# Patient Record
Sex: MALE | Race: WHITE | NOT HISPANIC OR LATINO
[De-identification: names, ages, dates, MRNs, and addresses within clinical notes are randomized per-mention and may not be internally consistent; named-entity substitution may affect disease eponyms.]

---

## 2017-05-18 ENCOUNTER — NON-APPOINTMENT (OUTPATIENT)
Age: 52
End: 2017-05-18

## 2017-05-18 ENCOUNTER — APPOINTMENT (OUTPATIENT)
Dept: CARDIOLOGY | Facility: CLINIC | Age: 52
End: 2017-05-18

## 2017-05-18 VITALS
HEART RATE: 62 BPM | SYSTOLIC BLOOD PRESSURE: 128 MMHG | WEIGHT: 224 LBS | HEIGHT: 74 IN | DIASTOLIC BLOOD PRESSURE: 76 MMHG | OXYGEN SATURATION: 97 % | BODY MASS INDEX: 28.75 KG/M2

## 2017-05-18 DIAGNOSIS — E78.00 PURE HYPERCHOLESTEROLEMIA, UNSPECIFIED: ICD-10-CM

## 2017-07-07 ENCOUNTER — APPOINTMENT (OUTPATIENT)
Dept: CARDIOLOGY | Facility: CLINIC | Age: 52
End: 2017-07-07

## 2017-07-31 ENCOUNTER — APPOINTMENT (OUTPATIENT)
Dept: CARDIOLOGY | Facility: CLINIC | Age: 52
End: 2017-07-31
Payer: COMMERCIAL

## 2017-07-31 PROCEDURE — 93015 CV STRESS TEST SUPVJ I&R: CPT

## 2017-08-01 ENCOUNTER — RESULT REVIEW (OUTPATIENT)
Age: 52
End: 2017-08-01

## 2017-08-01 DIAGNOSIS — R94.39 ABNORMAL RESULT OF OTHER CARDIOVASCULAR FUNCTION STUDY: ICD-10-CM

## 2017-08-09 ENCOUNTER — RESULT REVIEW (OUTPATIENT)
Age: 52
End: 2017-08-09

## 2017-08-09 LAB
ANION GAP SERPL CALC-SCNC: 14 MMOL/L
BUN SERPL-MCNC: 13 MG/DL
CALCIUM SERPL-MCNC: 9.8 MG/DL
CHLORIDE SERPL-SCNC: 102 MMOL/L
CHOLEST SERPL-MCNC: 156 MG/DL
CHOLEST/HDLC SERPL: 5.4 RATIO
CO2 SERPL-SCNC: 25 MMOL/L
CREAT SERPL-MCNC: 1.3 MG/DL
GLUCOSE SERPL-MCNC: 96 MG/DL
HBA1C MFR BLD HPLC: 5.7 %
HDLC SERPL-MCNC: 29 MG/DL
LDLC SERPL CALC-MCNC: 92 MG/DL
POTASSIUM SERPL-SCNC: 4.5 MMOL/L
SODIUM SERPL-SCNC: 141 MMOL/L
TRIGL SERPL-MCNC: 173 MG/DL

## 2017-08-22 ENCOUNTER — OUTPATIENT (OUTPATIENT)
Dept: OUTPATIENT SERVICES | Facility: HOSPITAL | Age: 52
LOS: 1 days | End: 2017-08-22
Payer: COMMERCIAL

## 2017-08-22 DIAGNOSIS — R94.39 ABNORMAL RESULT OF OTHER CARDIOVASCULAR FUNCTION STUDY: ICD-10-CM

## 2017-08-22 PROCEDURE — 75574 CT ANGIO HRT W/3D IMAGE: CPT | Mod: 26

## 2017-08-22 PROCEDURE — 75574 CT ANGIO HRT W/3D IMAGE: CPT

## 2017-08-23 ENCOUNTER — OTHER (OUTPATIENT)
Age: 52
End: 2017-08-23

## 2017-08-23 RX ORDER — METOPROLOL SUCCINATE 25 MG/1
25 TABLET, EXTENDED RELEASE ORAL
Qty: 90 | Refills: 3 | Status: ACTIVE | COMMUNITY
Start: 2017-08-23 | End: 1900-01-01

## 2017-08-24 ENCOUNTER — OUTPATIENT (OUTPATIENT)
Dept: OUTPATIENT SERVICES | Facility: HOSPITAL | Age: 52
LOS: 1 days | Discharge: HOME | End: 2017-08-24
Payer: COMMERCIAL

## 2017-08-24 ENCOUNTER — TRANSCRIPTION ENCOUNTER (OUTPATIENT)
Age: 52
End: 2017-08-24

## 2017-08-24 VITALS
HEART RATE: 66 BPM | TEMPERATURE: 98 F | DIASTOLIC BLOOD PRESSURE: 90 MMHG | OXYGEN SATURATION: 98 % | RESPIRATION RATE: 18 BRPM | SYSTOLIC BLOOD PRESSURE: 138 MMHG

## 2017-08-24 VITALS
SYSTOLIC BLOOD PRESSURE: 137 MMHG | RESPIRATION RATE: 16 BRPM | HEART RATE: 66 BPM | DIASTOLIC BLOOD PRESSURE: 80 MMHG | OXYGEN SATURATION: 98 %

## 2017-08-24 DIAGNOSIS — Z98.890 OTHER SPECIFIED POSTPROCEDURAL STATES: Chronic | ICD-10-CM

## 2017-08-24 DIAGNOSIS — J33.8 OTHER POLYP OF SINUS: Chronic | ICD-10-CM

## 2017-08-24 DIAGNOSIS — R94.39 ABNORMAL RESULT OF OTHER CARDIOVASCULAR FUNCTION STUDY: ICD-10-CM

## 2017-08-24 LAB
ALBUMIN SERPL ELPH-MCNC: 4.2 G/DL — SIGNIFICANT CHANGE UP (ref 3.3–5.2)
ALP SERPL-CCNC: 36 U/L — LOW (ref 40–120)
ALT FLD-CCNC: 32 U/L — SIGNIFICANT CHANGE UP
ANION GAP SERPL CALC-SCNC: 13 MMOL/L — SIGNIFICANT CHANGE UP (ref 5–17)
AST SERPL-CCNC: 20 U/L — SIGNIFICANT CHANGE UP
BILIRUB DIRECT SERPL-MCNC: 0.1 MG/DL — SIGNIFICANT CHANGE UP (ref 0–0.3)
BILIRUB INDIRECT FLD-MCNC: 0.2 MG/DL — SIGNIFICANT CHANGE UP (ref 0.2–1)
BILIRUB SERPL-MCNC: 0.3 MG/DL — LOW (ref 0.4–2)
BUN SERPL-MCNC: 13 MG/DL — SIGNIFICANT CHANGE UP (ref 8–20)
CALCIUM SERPL-MCNC: 9.2 MG/DL — SIGNIFICANT CHANGE UP (ref 8.6–10.2)
CHLORIDE SERPL-SCNC: 103 MMOL/L — SIGNIFICANT CHANGE UP (ref 98–107)
CHOLEST SERPL-MCNC: 138 MG/DL — SIGNIFICANT CHANGE UP (ref 110–199)
CO2 SERPL-SCNC: 25 MMOL/L — SIGNIFICANT CHANGE UP (ref 22–29)
CREAT SERPL-MCNC: 0.89 MG/DL — SIGNIFICANT CHANGE UP (ref 0.5–1.3)
GLUCOSE SERPL-MCNC: 108 MG/DL — SIGNIFICANT CHANGE UP (ref 70–115)
HCT VFR BLD CALC: 37.2 % — LOW (ref 42–52)
HDLC SERPL-MCNC: 31 MG/DL — LOW
HGB BLD-MCNC: 13 G/DL — LOW (ref 14–18)
INR BLD: 1.02 RATIO — SIGNIFICANT CHANGE UP (ref 0.88–1.16)
LIPID PNL WITH DIRECT LDL SERPL: 73 MG/DL — SIGNIFICANT CHANGE UP
MCHC RBC-ENTMCNC: 31.6 PG — HIGH (ref 27–31)
MCHC RBC-ENTMCNC: 34.9 G/DL — SIGNIFICANT CHANGE UP (ref 32–36)
MCV RBC AUTO: 90.3 FL — SIGNIFICANT CHANGE UP (ref 80–94)
PLATELET # BLD AUTO: 183 K/UL — SIGNIFICANT CHANGE UP (ref 150–400)
POTASSIUM SERPL-MCNC: 4 MMOL/L — SIGNIFICANT CHANGE UP (ref 3.5–5.3)
POTASSIUM SERPL-SCNC: 4 MMOL/L — SIGNIFICANT CHANGE UP (ref 3.5–5.3)
PROT SERPL-MCNC: 6.6 G/DL — SIGNIFICANT CHANGE UP (ref 6.6–8.7)
PROTHROM AB SERPL-ACNC: 11.2 SEC — SIGNIFICANT CHANGE UP (ref 9.8–12.7)
RBC # BLD: 4.12 M/UL — LOW (ref 4.6–6.2)
RBC # FLD: 12.9 % — SIGNIFICANT CHANGE UP (ref 11–15.6)
SODIUM SERPL-SCNC: 141 MMOL/L — SIGNIFICANT CHANGE UP (ref 135–145)
TOTAL CHOLESTEROL/HDL RATIO MEASUREMENT: 4 RATIO — SIGNIFICANT CHANGE UP (ref 3.4–9.6)
TRIGL SERPL-MCNC: 171 MG/DL — SIGNIFICANT CHANGE UP (ref 10–200)
WBC # BLD: 3.9 K/UL — LOW (ref 4.8–10.8)
WBC # FLD AUTO: 3.9 K/UL — LOW (ref 4.8–10.8)

## 2017-08-24 PROCEDURE — 93571 IV DOP VEL&/PRESS C FLO 1ST: CPT | Mod: LD

## 2017-08-24 PROCEDURE — 80048 BASIC METABOLIC PNL TOTAL CA: CPT

## 2017-08-24 PROCEDURE — C1769: CPT

## 2017-08-24 PROCEDURE — 85610 PROTHROMBIN TIME: CPT

## 2017-08-24 PROCEDURE — C1887: CPT

## 2017-08-24 PROCEDURE — 93005 ELECTROCARDIOGRAM TRACING: CPT

## 2017-08-24 PROCEDURE — 85027 COMPLETE CBC AUTOMATED: CPT

## 2017-08-24 PROCEDURE — 80076 HEPATIC FUNCTION PANEL: CPT

## 2017-08-24 PROCEDURE — 93458 L HRT ARTERY/VENTRICLE ANGIO: CPT | Mod: 26

## 2017-08-24 PROCEDURE — 93571 IV DOP VEL&/PRESS C FLO 1ST: CPT | Mod: 26,LD

## 2017-08-24 PROCEDURE — 93010 ELECTROCARDIOGRAM REPORT: CPT

## 2017-08-24 PROCEDURE — C1894: CPT

## 2017-08-24 PROCEDURE — 93458 L HRT ARTERY/VENTRICLE ANGIO: CPT

## 2017-08-24 PROCEDURE — 80061 LIPID PANEL: CPT

## 2017-08-24 PROCEDURE — 36415 COLL VENOUS BLD VENIPUNCTURE: CPT

## 2017-08-24 RX ORDER — ASPIRIN/CALCIUM CARB/MAGNESIUM 324 MG
81 TABLET ORAL ONCE
Qty: 0 | Refills: 0 | Status: COMPLETED | OUTPATIENT
Start: 2017-08-24 | End: 2017-08-24

## 2017-08-24 RX ADMIN — Medication 81 MILLIGRAM(S): at 12:01

## 2017-08-24 NOTE — H&P PST ADULT - FAMILY HISTORY
<<-----Click on this checkbox to enter Family History Family history of hyperlipidemia     Family history of stroke     Father  Still living? No  Family history of diabetes mellitus, Age at diagnosis: Age Unknown

## 2017-08-24 NOTE — DISCHARGE NOTE ADULT - MEDICATION SUMMARY - MEDICATIONS TO TAKE
I will START or STAY ON the medications listed below when I get home from the hospital:    berberine  -- 1000 milligram(s) by mouth 2 times a day  -- Indication: For blood sugar control    aspirin 81 mg oral tablet  -- 1 tab(s) by mouth once a day  -- Indication: For Heart protective    simvastatin 20 mg oral tablet  -- 1 tab(s) by mouth once a day (at bedtime)  -- Indication: For High cholesterol    fenofibrate 160 mg oral tablet  -- 1 tab(s) by mouth once a day  -- Indication: For High triglycerides    losartan-hydrochlorothiazide 100mg-12.5mg oral tablet  -- 1 tab(s) by mouth once a day  -- Indication: For HTN    Flax Oil oral capsule  -- 1000 milligram(s) by mouth once a day  -- Indication: For Supplement    omeprazole 20 mg oral delayed release capsule  -- 1 cap(s) by mouth once a day  -- Indication: For Heartburn    folic acid 0.8 mg oral tablet  -- 1 tab(s) by mouth once a day  -- Indication: For Supplement

## 2017-08-24 NOTE — DISCHARGE NOTE ADULT - HOSPITAL COURSE
s/p LHC with LAD and LCX non-obstructive disease; official report pending  Right radial access without complications    ROS:  Resp: Denies dyspnea or SOB  CV: Denies chest pain, palpitations, TY  GI: No black/bloody stools  : No hematuria  Heme: No bleeding/bruising problems  Neuro: Denies dizziness    Physical Exam:  Gen: Awake, alert, in no acute distress  Ext: right radial band in place - No hematoma or edema, + distal pulses  Neuro: A&OX3      Vital Signs Last 24 Hrs  T(C): 36.7 (24 Aug 2017 09:28), Max: 36.7 (24 Aug 2017 09:28)  T(F): 98.1 (24 Aug 2017 09:28), Max: 98.1 (24 Aug 2017 09:28)  HR: 65 (24 Aug 2017 12:01) (65 - 78)  BP: 132/89 (24 Aug 2017 12:01) (132/89 - 140/89)  BP(mean): --  RR: 169 (24 Aug 2017 12:01) (16 - 169)  SpO2: 95% (24 Aug 2017 11:46) (95% - 98%)    A: 51 y/o male with hx HTN, HLD, borderline DM (reports recent HgbA1c of 5.6), with c/o occasional midsternal chest discomfort which he describes as a "cramping," "indigestion" feeling, unrelated to activity, last from a few seconds to a few minutes and resolves on own. Symptoms have been occurring over the past 3 - 4 months. Pt had an abnormal exercise treadmill stress test and abnormal CTA now s/p cath which showed nonobstructive CAD    P: Continue current medications including aspirin and statin, anti-hypertensive  Outpatient follow up with Dr Reddy

## 2017-08-24 NOTE — DISCHARGE NOTE ADULT - CARE PLAN
Principal Discharge DX:	Abnormal stress test  Goal:	Optimal cardiac function  Instructions for follow-up, activity and diet:	Right radial cath site  Restricted use of affected arm for 48 hours. No heavy lifting (10 lbs or more) for 5-7 days.   No submerging the arm in water for 48 hours.  You may start showering today.   Call your doctor for any bleeding, swelling, loss of sensation in the hand or fingers, or fingers turning blue.   If heavy bleeding or large lumps form, hold pressure at the spot and come to the Emergency Room.

## 2017-08-24 NOTE — H&P PST ADULT - ASSESSMENT
51 y/o male with hx HTN, HLD, borderline DM (reports recent HgbA1c of 5.6), with c/o occasional midsternal chest discomfort which he describes as a "cramping," "indigestion" feeling, unrelated to activity, last from a few seconds to a few minutes and resolves on own. Symptoms have been occurring over the past 3 - 4 months. Pt followed up recently with Cardiologist and had an exercise treadmill stress test (7/31/17) DTS = 1, with 2 mm ST depressions II, III, aVF, V3 and V4. CTA (8/22/17) EF 70% with heterogenous plaque in the mLAD, soft plaque D1    ·	Plan: [facility] to evaluate coronaries  aspirin 81 mg PO x1

## 2017-08-24 NOTE — DISCHARGE NOTE ADULT - CARE PROVIDER_API CALL
Bri Reddy (DO), Internal Medicine  [address] [place] [address]  Phone: [phone]  Fax: [phone]

## 2017-08-24 NOTE — DISCHARGE NOTE ADULT - PLAN OF CARE
Optimal cardiac function Right radial cath site  Restricted use of affected arm for 48 hours. No heavy lifting (10 lbs or more) for 5-7 days.   No submerging the arm in water for 48 hours.  You may start showering today.   Call your doctor for any bleeding, swelling, loss of sensation in the hand or fingers, or fingers turning blue.   If heavy bleeding or large lumps form, hold pressure at the spot and come to the Emergency Room.

## 2017-08-24 NOTE — DISCHARGE NOTE ADULT - NS AS ACTIVITY OBS
Showering allowed/Walking-Indoors allowed/Walking-Outdoors allowed/Do not make important decisions/Do not drive or operate machinery/Stairs allowed/No Heavy lifting/straining

## 2017-08-24 NOTE — H&P PST ADULT - HISTORY OF PRESENT ILLNESS
53 y/o male with hx HTN, HLD, borderline DM (reports recent HgbA1c of 5.6), with c/o occasional midsternal chest discomfort which he describes as a "cramping," "indigestion" feeling, unrelated to activity, last from a few seconds to a few minutes and resolves on own. Symptoms have been occurring over the past 3 - 4 months. Pt followed up recently with Cardiologist and had an exercise treadmill stress test (7/31/17) DTS = 1, with 2 mm ST depressions II, III, aVF, V3 and V4. CTA (8/22/17) EF 70% with heterogenous plaque in the mLAD, soft plaque D1.

## 2017-08-29 ENCOUNTER — OTHER (OUTPATIENT)
Age: 52
End: 2017-08-29

## 2017-08-29 DIAGNOSIS — R07.89 OTHER CHEST PAIN: ICD-10-CM

## 2017-09-26 ENCOUNTER — TRANSCRIPTION ENCOUNTER (OUTPATIENT)
Age: 52
End: 2017-09-26

## 2017-09-26 ENCOUNTER — APPOINTMENT (OUTPATIENT)
Dept: CARDIOLOGY | Facility: CLINIC | Age: 52
End: 2017-09-26

## 2017-11-20 ENCOUNTER — APPOINTMENT (OUTPATIENT)
Dept: CARDIOLOGY | Facility: CLINIC | Age: 52
End: 2017-11-20

## 2018-01-09 ENCOUNTER — APPOINTMENT (OUTPATIENT)
Dept: CARDIOLOGY | Facility: CLINIC | Age: 53
End: 2018-01-09
Payer: COMMERCIAL

## 2018-01-09 VITALS
WEIGHT: 235 LBS | HEART RATE: 70 BPM | SYSTOLIC BLOOD PRESSURE: 122 MMHG | BODY MASS INDEX: 30.17 KG/M2 | DIASTOLIC BLOOD PRESSURE: 80 MMHG | OXYGEN SATURATION: 97 %

## 2018-01-09 DIAGNOSIS — I10 ESSENTIAL (PRIMARY) HYPERTENSION: ICD-10-CM

## 2018-01-09 DIAGNOSIS — E78.5 HYPERLIPIDEMIA, UNSPECIFIED: ICD-10-CM

## 2018-01-09 DIAGNOSIS — R22.2 LOCALIZED SWELLING, MASS AND LUMP, TRUNK: ICD-10-CM

## 2018-01-09 PROCEDURE — 99215 OFFICE O/P EST HI 40 MIN: CPT

## 2018-01-09 PROCEDURE — 93000 ELECTROCARDIOGRAM COMPLETE: CPT

## 2018-01-13 ENCOUNTER — TRANSCRIPTION ENCOUNTER (OUTPATIENT)
Age: 53
End: 2018-01-13

## 2018-02-15 ENCOUNTER — TRANSCRIPTION ENCOUNTER (OUTPATIENT)
Age: 53
End: 2018-02-15

## 2018-02-22 ENCOUNTER — OUTPATIENT (OUTPATIENT)
Dept: OUTPATIENT SERVICES | Facility: HOSPITAL | Age: 53
LOS: 1 days | End: 2018-02-22
Payer: COMMERCIAL

## 2018-02-22 ENCOUNTER — APPOINTMENT (OUTPATIENT)
Dept: CT IMAGING | Facility: CLINIC | Age: 53
End: 2018-02-22
Payer: COMMERCIAL

## 2018-02-22 DIAGNOSIS — R22.2 LOCALIZED SWELLING, MASS AND LUMP, TRUNK: ICD-10-CM

## 2018-02-22 DIAGNOSIS — Z98.890 OTHER SPECIFIED POSTPROCEDURAL STATES: Chronic | ICD-10-CM

## 2018-02-22 DIAGNOSIS — J33.8 OTHER POLYP OF SINUS: Chronic | ICD-10-CM

## 2018-02-22 PROCEDURE — 71260 CT THORAX DX C+: CPT

## 2018-02-22 PROCEDURE — 71260 CT THORAX DX C+: CPT | Mod: 26

## 2018-02-22 PROCEDURE — 82565 ASSAY OF CREATININE: CPT

## 2018-04-16 NOTE — DISCHARGE NOTE ADULT - PATIENT PORTAL LINK FT
“You can access the Follow[employer] Patient Portal, offered by [facility], by registering with the following website: [email or web address]” (1) assistive equipment/wheelchair

## 2018-10-09 ENCOUNTER — APPOINTMENT (OUTPATIENT)
Dept: CARDIOLOGY | Facility: CLINIC | Age: 53
End: 2018-10-09

## 2019-05-30 PROBLEM — I10 ESSENTIAL (PRIMARY) HYPERTENSION: Chronic | Status: ACTIVE | Noted: 2017-08-24

## 2019-05-30 PROBLEM — E78.1 PURE HYPERGLYCERIDEMIA: Chronic | Status: ACTIVE | Noted: 2017-08-24

## 2019-05-30 PROBLEM — E78.2 MIXED HYPERLIPIDEMIA: Chronic | Status: ACTIVE | Noted: 2017-08-24

## 2019-06-14 ENCOUNTER — TRANSCRIPTION ENCOUNTER (OUTPATIENT)
Age: 54
End: 2019-06-14

## 2019-06-17 ENCOUNTER — EMERGENCY (EMERGENCY)
Facility: HOSPITAL | Age: 54
LOS: 1 days | Discharge: HOME | End: 2019-06-17
Attending: EMERGENCY MEDICINE
Payer: COMMERCIAL

## 2019-06-17 VITALS
HEIGHT: 74 IN | WEIGHT: 225.09 LBS | OXYGEN SATURATION: 98 % | TEMPERATURE: 98 F | RESPIRATION RATE: 16 BRPM | SYSTOLIC BLOOD PRESSURE: 131 MMHG | DIASTOLIC BLOOD PRESSURE: 82 MMHG | HEART RATE: 77 BPM

## 2019-06-17 DIAGNOSIS — J33.8 OTHER POLYP OF SINUS: Chronic | ICD-10-CM

## 2019-06-17 DIAGNOSIS — Z98.890 OTHER SPECIFIED POSTPROCEDURAL STATES: Chronic | ICD-10-CM

## 2019-06-17 LAB
ALBUMIN SERPL ELPH-MCNC: 3.8 G/DL — SIGNIFICANT CHANGE UP (ref 3.3–5.2)
ALP SERPL-CCNC: 46 U/L — SIGNIFICANT CHANGE UP (ref 40–120)
ALT FLD-CCNC: 14 U/L — SIGNIFICANT CHANGE UP
ANION GAP SERPL CALC-SCNC: 14 MMOL/L — SIGNIFICANT CHANGE UP (ref 5–17)
AST SERPL-CCNC: 14 U/L — SIGNIFICANT CHANGE UP
BASOPHILS # BLD AUTO: 0 K/UL — SIGNIFICANT CHANGE UP (ref 0–0.2)
BASOPHILS NFR BLD AUTO: 0.2 % — SIGNIFICANT CHANGE UP (ref 0–2)
BILIRUB SERPL-MCNC: 0.6 MG/DL — SIGNIFICANT CHANGE UP (ref 0.4–2)
BUN SERPL-MCNC: 14 MG/DL — SIGNIFICANT CHANGE UP (ref 8–20)
CALCIUM SERPL-MCNC: 9.6 MG/DL — SIGNIFICANT CHANGE UP (ref 8.6–10.2)
CHLORIDE SERPL-SCNC: 99 MMOL/L — SIGNIFICANT CHANGE UP (ref 98–107)
CK SERPL-CCNC: 26 U/L — LOW (ref 30–200)
CO2 SERPL-SCNC: 26 MMOL/L — SIGNIFICANT CHANGE UP (ref 22–29)
CREAT SERPL-MCNC: 0.85 MG/DL — SIGNIFICANT CHANGE UP (ref 0.5–1.3)
CRP SERPL-MCNC: 7.64 MG/DL — HIGH (ref 0–0.4)
EOSINOPHIL # BLD AUTO: 0.2 K/UL — SIGNIFICANT CHANGE UP (ref 0–0.5)
EOSINOPHIL NFR BLD AUTO: 3.5 % — SIGNIFICANT CHANGE UP (ref 0–5)
ERYTHROCYTE [SEDIMENTATION RATE] IN BLOOD: 50 MM/HR — HIGH (ref 0–20)
GLUCOSE SERPL-MCNC: 120 MG/DL — HIGH (ref 70–115)
HCT VFR BLD CALC: 36.5 % — LOW (ref 42–52)
HGB BLD-MCNC: 12.2 G/DL — LOW (ref 14–18)
LYMPHOCYTES # BLD AUTO: 0.7 K/UL — LOW (ref 1–4.8)
LYMPHOCYTES # BLD AUTO: 11.8 % — LOW (ref 20–55)
MCHC RBC-ENTMCNC: 30.7 PG — SIGNIFICANT CHANGE UP (ref 27–31)
MCHC RBC-ENTMCNC: 33.4 G/DL — SIGNIFICANT CHANGE UP (ref 32–36)
MCV RBC AUTO: 91.7 FL — SIGNIFICANT CHANGE UP (ref 80–94)
MONOCYTES # BLD AUTO: 0.6 K/UL — SIGNIFICANT CHANGE UP (ref 0–0.8)
MONOCYTES NFR BLD AUTO: 10.5 % — HIGH (ref 3–10)
NEUTROPHILS # BLD AUTO: 4.5 K/UL — SIGNIFICANT CHANGE UP (ref 1.8–8)
NEUTROPHILS NFR BLD AUTO: 72.7 % — SIGNIFICANT CHANGE UP (ref 37–73)
PLATELET # BLD AUTO: 239 K/UL — SIGNIFICANT CHANGE UP (ref 150–400)
POTASSIUM SERPL-MCNC: 4 MMOL/L — SIGNIFICANT CHANGE UP (ref 3.5–5.3)
POTASSIUM SERPL-SCNC: 4 MMOL/L — SIGNIFICANT CHANGE UP (ref 3.5–5.3)
PROT SERPL-MCNC: 6.9 G/DL — SIGNIFICANT CHANGE UP (ref 6.6–8.7)
RBC # BLD: 3.98 M/UL — LOW (ref 4.6–6.2)
RBC # FLD: 13.1 % — SIGNIFICANT CHANGE UP (ref 11–15.6)
SODIUM SERPL-SCNC: 139 MMOL/L — SIGNIFICANT CHANGE UP (ref 135–145)
WBC # BLD: 6.2 K/UL — SIGNIFICANT CHANGE UP (ref 4.8–10.8)
WBC # FLD AUTO: 6.2 K/UL — SIGNIFICANT CHANGE UP (ref 4.8–10.8)

## 2019-06-17 PROCEDURE — 86666 EHRLICHIA ANTIBODY: CPT

## 2019-06-17 PROCEDURE — 86753 PROTOZOA ANTIBODY NOS: CPT

## 2019-06-17 PROCEDURE — 99283 EMERGENCY DEPT VISIT LOW MDM: CPT | Mod: 25

## 2019-06-17 PROCEDURE — 85027 COMPLETE CBC AUTOMATED: CPT

## 2019-06-17 PROCEDURE — 96372 THER/PROPH/DIAG INJ SC/IM: CPT

## 2019-06-17 PROCEDURE — 80053 COMPREHEN METABOLIC PANEL: CPT

## 2019-06-17 PROCEDURE — 36415 COLL VENOUS BLD VENIPUNCTURE: CPT

## 2019-06-17 PROCEDURE — 82550 ASSAY OF CK (CPK): CPT

## 2019-06-17 PROCEDURE — 85652 RBC SED RATE AUTOMATED: CPT

## 2019-06-17 PROCEDURE — 86140 C-REACTIVE PROTEIN: CPT

## 2019-06-17 RX ORDER — KETOROLAC TROMETHAMINE 30 MG/ML
15 SYRINGE (ML) INJECTION ONCE
Refills: 0 | Status: DISCONTINUED | OUTPATIENT
Start: 2019-06-17 | End: 2019-06-17

## 2019-06-17 RX ORDER — ACETAMINOPHEN 500 MG
975 TABLET ORAL ONCE
Refills: 0 | Status: COMPLETED | OUTPATIENT
Start: 2019-06-17 | End: 2019-06-17

## 2019-06-17 RX ADMIN — Medication 15 MILLIGRAM(S): at 09:59

## 2019-06-17 RX ADMIN — Medication 975 MILLIGRAM(S): at 08:24

## 2019-06-17 NOTE — ED STATDOCS - OBJECTIVE STATEMENT
52 y/o M pt of PMHx of HTN, high triglyceride, HLD and PSHx of hernia surgery (6 weeks ago), and ACL presents to ED c/o generalized body aches, joint pain to entire body, and b/l swollen hands that began 2 weeks ago. Pt reports the pain is severe, and rates a 10 in severity. He takes Fenofibrate, Simvastatin, and Losartan. Pt takes 600mg of Advil 4-5x a day for pain. Notes he plays golf often, and works outside. Smokes 1 pack per week. NKDA. Denies past hx of arthritis, recent tick bite, leg swelling, body rash, fever, chills, sore throat, rhinorrhea, abdominal pain, nausea, vomiting. No further acute complaints at this time.

## 2019-06-17 NOTE — ED ADULT NURSE NOTE - OBJECTIVE STATEMENT
Pt A&OX3, amb ad josephine, c/o joint stiffness and pain with mild swelling to hands X 3 weeks.  Pt denies any recent injuries.  Mild swelling noted to bilat hands.  No obvious injuries noted throughout body,  Pt states he is a daily golfer and is continuously outdoors but has not seen any signs of ticks on his body.

## 2019-06-17 NOTE — ED STATDOCS - PHYSICAL EXAMINATION
Gen: NAD, AOx3  Head: NCAT  HEENT: PERRL, EOMI, oral mucosa moist, normal conjunctiva, neck supple  Lung: CTAB, no respiratory distress  CV: rrr, no murmur, Normal perfusion  Abd: soft, NTND, no CVA tenderness  MSK: No edema, no visible deformities, full ROM. mild swelling of b/l MCPs and wrist. no warmth or erythema.   Neuro: No focal neurologic deficits  Skin: No rash    Psych: normal affect Gen: NAD, AOx3  Head: NCAT  HEENT: PERRL, EOMI, oral mucosa moist, normal conjunctiva, neck supple  Lung: CTAB, no respiratory distress  CV: rrr, no murmur, Normal perfusion  Abd: soft, NTND  MSK: No edema, no visible deformities, full ROM. mild swelling of b/l MCPs and wrist. no warmth or erythema. no ttp joints, only pain with motion  Neuro: No focal neurologic deficits  Skin: No rash    Psych: normal affect

## 2019-06-17 NOTE — ED STATDOCS - NS ED ROS FT
ROS: no CP/SOB. no cough. no fever. no n/v/d/c. no abd pain. no rash. no bleeding. no urinary complaints. (+) generalized body aches. no weakness (+) joint pain. (+) b/l hand swelling. no leg swelling. no vision changes. no HA. no neck/back pain. no deformity. No change in mental status.

## 2019-06-17 NOTE — ED STATDOCS - PROGRESS NOTE DETAILS
TERRENCE Cesar NOTE: Pt evaluated at bedside. Pt evaluated prior by intake physician. Otherwise HPI/PE/ROS as noted above. Will follow up plan per intake physician. TERRENCE Cesar NOTE: Reviewed labs with MD Parish, pt to f/u with rheumatology. Patient stable for discharge, reports improvement in pain, vital signs stable, tolerating PO, ambulatory in ED.  Discussion with pt includes but is not limited to: results, plan, proper medication use and side effects, and return precautions. Patient will follow up with their PMD in 1-2 days and any specialists discussed. Advised patient to seek immediate medical attention for any new/worsening symptoms or concerns.  Patient provided with ample opportunity to ask questions which were answered to the fullest extent.  Patient given printed copies of lab/radiology results to aid in proper outpatient follow up. Patient verbalized understanding and agreement of plan.

## 2019-06-17 NOTE — ED STATDOCS - CHPI ED SYMPTOM NEG
no chills, no body rash, no sore throat, no rhinorrhea, no abdominal pain, no leg swelling/no diarrhea/no vomiting/no nausea/no fever

## 2019-06-17 NOTE — ED ADULT NURSE NOTE - CAS EDN DISCHARGE ASSESSMENT
Alert and oriented to person, place and time/No adverse reaction to first time med in ED/Patient baseline mental status/Awake/Symptoms improved

## 2019-06-17 NOTE — ED ADULT TRIAGE NOTE - CHIEF COMPLAINT QUOTE
"I had abdominal surgery for a hernia repair 6 weeks ago and since then I feel achy and I feel like I have the flu, my hands are swollen and my knuckles are swollen, I feel like all my joints are all achy and stiff. " Pt states he does play golf

## 2019-06-17 NOTE — ED ADULT NURSE REASSESSMENT NOTE - NS ED NURSE REASSESS COMMENT FT1
Pt A&OX3, amb ad josephine, states pain is much less than when he arrived to ED.  Requesting discharge.

## 2019-06-17 NOTE — ED STATDOCS - ATTENDING CONTRIBUTION TO CARE
I, Nellie Parish, performed the initial face to face bedside interview with this patient regarding history of present illness, review of symptoms and relevant past medical, social and family history.  I completed an independent physical examination.   The medical decision making and follow-up on ordered tests (ie labs, radiologic studies) and re-evaluation of the patient's status has been communicated to the ACP.  Disposition of the patient will be based on test outcome and response to ED interventions.  The history, relevant review of systems, past medical and surgical history, medical decision making, and physical examination was documented by the scribe in my presence and I attest to the accuracy of the documentation.

## 2019-06-17 NOTE — ED STATDOCS - CLINICAL SUMMARY MEDICAL DECISION MAKING FREE TEXT BOX
pt with symmetric joint pain, some swelling noted to hands and wrist. otherwise exam normal. afebrile. no rash. no limitation in ROM. does work outside. unclear if any tick bite hx. will send lyme panel, CK (pt is on Simvastatin) Toradol is renal function okay. outpatient followup with PCP or rheumatologist. pt with polyarthralgias, symmetric, some swelling noted to hands and wrist. otherwise exam normal. afebrile. no rash. no limitation in ROM. does work outside. unclear if any tick bite hx. will send lyme panel, CK (pt is on Simvastatin) Toradol if renal function okay. educated on proper dosing advil. outpatient followup with PCP or rheumatologist.

## 2019-06-20 LAB
A PHAGOCYTOPH IGG TITR SER IF: SIGNIFICANT CHANGE UP TITER
B BURGDOR AB SER QL IA: NEGATIVE — SIGNIFICANT CHANGE UP
B MICROTI IGG TITR SER: SIGNIFICANT CHANGE UP TITER
E CHAFFEENSIS IGG TITR SER IF: SIGNIFICANT CHANGE UP TITER

## 2019-07-02 ENCOUNTER — APPOINTMENT (OUTPATIENT)
Dept: CARDIOLOGY | Facility: CLINIC | Age: 54
End: 2019-07-02

## 2019-08-08 ENCOUNTER — EMERGENCY (EMERGENCY)
Facility: HOSPITAL | Age: 54
LOS: 1 days | Discharge: HOME | End: 2019-08-08
Attending: EMERGENCY MEDICINE
Payer: COMMERCIAL

## 2019-08-08 VITALS
TEMPERATURE: 99 F | HEART RATE: 89 BPM | OXYGEN SATURATION: 95 % | RESPIRATION RATE: 20 BRPM | SYSTOLIC BLOOD PRESSURE: 132 MMHG | DIASTOLIC BLOOD PRESSURE: 75 MMHG

## 2019-08-08 VITALS
OXYGEN SATURATION: 95 % | HEIGHT: 74 IN | SYSTOLIC BLOOD PRESSURE: 122 MMHG | WEIGHT: 186.95 LBS | DIASTOLIC BLOOD PRESSURE: 80 MMHG | HEART RATE: 125 BPM | RESPIRATION RATE: 20 BRPM | TEMPERATURE: 98 F

## 2019-08-08 DIAGNOSIS — Z98.890 OTHER SPECIFIED POSTPROCEDURAL STATES: Chronic | ICD-10-CM

## 2019-08-08 DIAGNOSIS — J33.8 OTHER POLYP OF SINUS: Chronic | ICD-10-CM

## 2019-08-08 LAB
ABO RH CONFIRMATION: SIGNIFICANT CHANGE UP
ALBUMIN SERPL ELPH-MCNC: 2.7 G/DL — LOW (ref 3.3–5.2)
ALP SERPL-CCNC: 115 U/L — SIGNIFICANT CHANGE UP (ref 40–120)
ALT FLD-CCNC: 22 U/L — SIGNIFICANT CHANGE UP
ANION GAP SERPL CALC-SCNC: 9 MMOL/L — SIGNIFICANT CHANGE UP (ref 5–17)
ANISOCYTOSIS BLD QL: SLIGHT — SIGNIFICANT CHANGE UP
APTT BLD: 27.7 SEC — SIGNIFICANT CHANGE UP (ref 27.5–36.3)
AST SERPL-CCNC: 19 U/L — SIGNIFICANT CHANGE UP
BASOPHILS # BLD AUTO: 0 K/UL — SIGNIFICANT CHANGE UP (ref 0–0.2)
BASOPHILS NFR BLD AUTO: 0 % — SIGNIFICANT CHANGE UP (ref 0–2)
BILIRUB SERPL-MCNC: 0.4 MG/DL — SIGNIFICANT CHANGE UP (ref 0.4–2)
BLD GP AB SCN SERPL QL: SIGNIFICANT CHANGE UP
BUN SERPL-MCNC: 21 MG/DL — HIGH (ref 8–20)
CALCIUM SERPL-MCNC: 9.6 MG/DL — SIGNIFICANT CHANGE UP (ref 8.6–10.2)
CHLORIDE SERPL-SCNC: 101 MMOL/L — SIGNIFICANT CHANGE UP (ref 98–107)
CO2 SERPL-SCNC: 24 MMOL/L — SIGNIFICANT CHANGE UP (ref 22–29)
CREAT SERPL-MCNC: 0.76 MG/DL — SIGNIFICANT CHANGE UP (ref 0.5–1.3)
ELLIPTOCYTES BLD QL SMEAR: SLIGHT — SIGNIFICANT CHANGE UP
EOSINOPHIL # BLD AUTO: 0 K/UL — SIGNIFICANT CHANGE UP (ref 0–0.5)
EOSINOPHIL NFR BLD AUTO: 0 % — SIGNIFICANT CHANGE UP (ref 0–6)
FERRITIN SERPL-MCNC: 894 NG/ML — HIGH (ref 30–400)
GIANT PLATELETS BLD QL SMEAR: PRESENT — SIGNIFICANT CHANGE UP
GLUCOSE SERPL-MCNC: 111 MG/DL — SIGNIFICANT CHANGE UP (ref 70–115)
HCT VFR BLD CALC: 27.3 % — LOW (ref 39–50)
HCT VFR BLD CALC: 28.8 % — LOW (ref 39–50)
HGB BLD-MCNC: 8.6 G/DL — LOW (ref 13–17)
HGB BLD-MCNC: 9.2 G/DL — LOW (ref 13–17)
INR BLD: 1.21 RATIO — HIGH (ref 0.88–1.16)
IRON SATN MFR SERPL: 13 % — LOW (ref 16–55)
IRON SATN MFR SERPL: 23 UG/DL — LOW (ref 59–158)
LIDOCAIN IGE QN: 50 U/L — SIGNIFICANT CHANGE UP (ref 22–51)
LYMPHOCYTES # BLD AUTO: 0.41 K/UL — LOW (ref 1–3.3)
LYMPHOCYTES # BLD AUTO: 4.4 % — LOW (ref 13–44)
MACROCYTES BLD QL: SLIGHT — SIGNIFICANT CHANGE UP
MAGNESIUM SERPL-MCNC: 1.6 MG/DL — SIGNIFICANT CHANGE UP (ref 1.6–2.6)
MANUAL SMEAR VERIFICATION: SIGNIFICANT CHANGE UP
MCHC RBC-ENTMCNC: 27.3 PG — SIGNIFICANT CHANGE UP (ref 27–34)
MCHC RBC-ENTMCNC: 27.5 PG — SIGNIFICANT CHANGE UP (ref 27–34)
MCHC RBC-ENTMCNC: 31.5 GM/DL — LOW (ref 32–36)
MCHC RBC-ENTMCNC: 31.9 GM/DL — LOW (ref 32–36)
MCV RBC AUTO: 86.2 FL — SIGNIFICANT CHANGE UP (ref 80–100)
MCV RBC AUTO: 86.7 FL — SIGNIFICANT CHANGE UP (ref 80–100)
METAMYELOCYTES # FLD: 2.6 % — HIGH (ref 0–0)
MICROCYTES BLD QL: SLIGHT — SIGNIFICANT CHANGE UP
MONOCYTES # BLD AUTO: 0.17 K/UL — SIGNIFICANT CHANGE UP (ref 0–0.9)
MONOCYTES NFR BLD AUTO: 1.8 % — LOW (ref 2–14)
MYELOCYTES NFR BLD: 4.4 % — HIGH (ref 0–0)
NEUTROPHILS # BLD AUTO: 8.09 K/UL — HIGH (ref 1.8–7.4)
NEUTROPHILS NFR BLD AUTO: 86.8 % — HIGH (ref 43–77)
NT-PROBNP SERPL-SCNC: 140 PG/ML — SIGNIFICANT CHANGE UP (ref 0–300)
OB PNL STL: NEGATIVE — SIGNIFICANT CHANGE UP
OVALOCYTES BLD QL SMEAR: SLIGHT — SIGNIFICANT CHANGE UP
PLAT MORPH BLD: NORMAL — SIGNIFICANT CHANGE UP
PLATELET # BLD AUTO: 383 K/UL — SIGNIFICANT CHANGE UP (ref 150–400)
PLATELET # BLD AUTO: 438 K/UL — HIGH (ref 150–400)
POIKILOCYTOSIS BLD QL AUTO: SLIGHT — SIGNIFICANT CHANGE UP
POLYCHROMASIA BLD QL SMEAR: SLIGHT — SIGNIFICANT CHANGE UP
POTASSIUM SERPL-MCNC: 4 MMOL/L — SIGNIFICANT CHANGE UP (ref 3.5–5.3)
POTASSIUM SERPL-SCNC: 4 MMOL/L — SIGNIFICANT CHANGE UP (ref 3.5–5.3)
PROT SERPL-MCNC: 6.2 G/DL — LOW (ref 6.6–8.7)
PROTHROM AB SERPL-ACNC: 14 SEC — HIGH (ref 10–12.9)
RBC # BLD: 3.15 M/UL — LOW (ref 4.2–5.8)
RBC # BLD: 3.34 M/UL — LOW (ref 4.2–5.8)
RBC # FLD: 15.6 % — HIGH (ref 10.3–14.5)
RBC # FLD: 15.7 % — HIGH (ref 10.3–14.5)
RBC BLD AUTO: ABNORMAL
SCHISTOCYTES BLD QL AUTO: SLIGHT — SIGNIFICANT CHANGE UP
SODIUM SERPL-SCNC: 134 MMOL/L — LOW (ref 135–145)
TARGETS BLD QL SMEAR: SLIGHT — SIGNIFICANT CHANGE UP
TIBC SERPL-MCNC: 180 UG/DL — LOW (ref 220–430)
TRANSFERRIN SERPL-MCNC: 126 MG/DL — LOW (ref 180–329)
TROPONIN T SERPL-MCNC: <0.01 NG/ML — SIGNIFICANT CHANGE UP (ref 0–0.06)
TROPONIN T SERPL-MCNC: <0.01 NG/ML — SIGNIFICANT CHANGE UP (ref 0–0.06)
WBC # BLD: 7.39 K/UL — SIGNIFICANT CHANGE UP (ref 3.8–10.5)
WBC # BLD: 9.32 K/UL — SIGNIFICANT CHANGE UP (ref 3.8–10.5)
WBC # FLD AUTO: 7.39 K/UL — SIGNIFICANT CHANGE UP (ref 3.8–10.5)
WBC # FLD AUTO: 9.32 K/UL — SIGNIFICANT CHANGE UP (ref 3.8–10.5)

## 2019-08-08 PROCEDURE — 36415 COLL VENOUS BLD VENIPUNCTURE: CPT

## 2019-08-08 PROCEDURE — 71046 X-RAY EXAM CHEST 2 VIEWS: CPT

## 2019-08-08 PROCEDURE — 71046 X-RAY EXAM CHEST 2 VIEWS: CPT | Mod: 26

## 2019-08-08 PROCEDURE — 83880 ASSAY OF NATRIURETIC PEPTIDE: CPT

## 2019-08-08 PROCEDURE — G0378: CPT

## 2019-08-08 PROCEDURE — 85027 COMPLETE CBC AUTOMATED: CPT

## 2019-08-08 PROCEDURE — 86850 RBC ANTIBODY SCREEN: CPT

## 2019-08-08 PROCEDURE — 82728 ASSAY OF FERRITIN: CPT

## 2019-08-08 PROCEDURE — 83550 IRON BINDING TEST: CPT

## 2019-08-08 PROCEDURE — 86923 COMPATIBILITY TEST ELECTRIC: CPT

## 2019-08-08 PROCEDURE — 93005 ELECTROCARDIOGRAM TRACING: CPT

## 2019-08-08 PROCEDURE — 71275 CT ANGIOGRAPHY CHEST: CPT | Mod: 26

## 2019-08-08 PROCEDURE — 99284 EMERGENCY DEPT VISIT MOD MDM: CPT | Mod: 25

## 2019-08-08 PROCEDURE — 83690 ASSAY OF LIPASE: CPT

## 2019-08-08 PROCEDURE — 86900 BLOOD TYPING SEROLOGIC ABO: CPT

## 2019-08-08 PROCEDURE — 85610 PROTHROMBIN TIME: CPT

## 2019-08-08 PROCEDURE — 83735 ASSAY OF MAGNESIUM: CPT

## 2019-08-08 PROCEDURE — 84484 ASSAY OF TROPONIN QUANT: CPT

## 2019-08-08 PROCEDURE — 93010 ELECTROCARDIOGRAM REPORT: CPT

## 2019-08-08 PROCEDURE — 93306 TTE W/DOPPLER COMPLETE: CPT

## 2019-08-08 PROCEDURE — 82272 OCCULT BLD FECES 1-3 TESTS: CPT

## 2019-08-08 PROCEDURE — 83540 ASSAY OF IRON: CPT

## 2019-08-08 PROCEDURE — 99218: CPT

## 2019-08-08 PROCEDURE — 84466 ASSAY OF TRANSFERRIN: CPT

## 2019-08-08 PROCEDURE — 86901 BLOOD TYPING SEROLOGIC RH(D): CPT

## 2019-08-08 PROCEDURE — 80053 COMPREHEN METABOLIC PANEL: CPT

## 2019-08-08 PROCEDURE — 85730 THROMBOPLASTIN TIME PARTIAL: CPT

## 2019-08-08 PROCEDURE — 71275 CT ANGIOGRAPHY CHEST: CPT

## 2019-08-08 PROCEDURE — 93306 TTE W/DOPPLER COMPLETE: CPT | Mod: 26

## 2019-08-08 RX ORDER — FOLIC ACID 0.8 MG
1 TABLET ORAL DAILY
Refills: 0 | Status: DISCONTINUED | OUTPATIENT
Start: 2019-08-08 | End: 2019-08-15

## 2019-08-08 RX ORDER — FENOFIBRATE,MICRONIZED 130 MG
48 CAPSULE ORAL DAILY
Refills: 0 | Status: DISCONTINUED | OUTPATIENT
Start: 2019-08-08 | End: 2019-08-08

## 2019-08-08 RX ORDER — HYDROCHLOROTHIAZIDE 25 MG
12.5 TABLET ORAL DAILY
Refills: 0 | Status: DISCONTINUED | OUTPATIENT
Start: 2019-08-08 | End: 2019-08-15

## 2019-08-08 RX ORDER — LOSARTAN POTASSIUM 100 MG/1
25 TABLET, FILM COATED ORAL DAILY
Refills: 0 | Status: DISCONTINUED | OUTPATIENT
Start: 2019-08-08 | End: 2019-08-08

## 2019-08-08 RX ORDER — LOSARTAN POTASSIUM 100 MG/1
100 TABLET, FILM COATED ORAL DAILY
Refills: 0 | Status: DISCONTINUED | OUTPATIENT
Start: 2019-08-08 | End: 2019-08-15

## 2019-08-08 RX ORDER — FERROUS SULFATE 325(65) MG
1 TABLET ORAL
Qty: 30 | Refills: 0
Start: 2019-08-08 | End: 2019-09-06

## 2019-08-08 RX ORDER — FENOFIBRATE,MICRONIZED 130 MG
145 CAPSULE ORAL DAILY
Refills: 0 | Status: DISCONTINUED | OUTPATIENT
Start: 2019-08-08 | End: 2019-08-15

## 2019-08-08 RX ORDER — ATORVASTATIN CALCIUM 80 MG/1
40 TABLET, FILM COATED ORAL AT BEDTIME
Refills: 0 | Status: DISCONTINUED | OUTPATIENT
Start: 2019-08-08 | End: 2019-08-15

## 2019-08-08 RX ADMIN — Medication 10 MILLIGRAM(S): at 15:27

## 2019-08-08 RX ADMIN — Medication 12.5 MILLIGRAM(S): at 15:27

## 2019-08-08 RX ADMIN — LOSARTAN POTASSIUM 100 MILLIGRAM(S): 100 TABLET, FILM COATED ORAL at 15:27

## 2019-08-08 RX ADMIN — Medication 1 MILLIGRAM(S): at 15:27

## 2019-08-08 NOTE — ED CDU PROVIDER INITIAL DAY NOTE - ATTENDING CONTRIBUTION TO CARE
developed joint pain, weight loss, night sweats after hernia surgery.  dxd with SLE and recently placed on methotrexate.   Was on prednsione for 30 days; stopped last week for 2 days but restarted d/t joint pain recurrence.  Report inability to take a deep breath d/t pain in left posterior back/ thorax.  SOB on exertion.  Fatigued.  PE;  nontoxic appearing, NARD, chest CTA, heart reg with no obvious murmur.  Anemia noted on lab studies.  Awaiting echo.  Patient not certain that he wants transfusion:  risk/ benefits addressed at length.

## 2019-08-08 NOTE — ED ADULT NURSE REASSESSMENT NOTE - NS ED NURSE REASSESS COMMENT FT1
oob to bathroom with steady gait. Resident at bedside updated pt and family of plan of care, questions asked and answered.

## 2019-08-08 NOTE — ED ADULT TRIAGE NOTE - CHIEF COMPLAINT QUOTE
I was diagnosed with Lupus last week and Im having SOB and pain in my back that woke me up from my sleep, pt in no distress, reporting tachycardia for weeks since beginning daily prednisone, pt reporting 40lb weight loss since first week of June, pt reports TY within 200ft, pt calm and comfortable in triage

## 2019-08-08 NOTE — ED CDU PROVIDER INITIAL DAY NOTE - MEDICAL DECISION MAKING DETAILS
54 year old male recently diagnosed with Lupus on methotrexate, p/w sharp lower back pain x 3 days worsening SOB, seen by cardiology who recommends echo, h&h drop from last ED visit in June from 12.2/36->8.6/27, may need transfusion (discussion with heme recommends 1U PRBCs, will contact primary rheumatologist [facility] for recommendations, has associated weight loss, night sweats, and joint pain, h/o ex smoker

## 2019-08-08 NOTE — ED ADULT NURSE NOTE - CHPI ED NUR SYMPTOMS NEG
no nausea/no chills/no fever/no dizziness/no tingling/no weakness/no vomiting/no decreased eating/drinking

## 2019-08-08 NOTE — ED PROVIDER NOTE - PHYSICAL EXAMINATION
General: Awake, alert and oriented. In no acute distress. Well appearing.  HEENT: Normocephalic, atraumatic. Moist mucous membranes. Oropharynx clear.   Neck:. Soft and supple. Full ROM without pain. No midline tenderness  Cardiac: Regular rate and regular rhythm. +S1/S2. No murmurs, rubs, gallops. Peripheral pulses 2+ and symmetric. No LE edema.  Resp: Speaking in full sentences. No evidence of respiratory distress. No wheezes, rales or rhonchi.  Back: Spine midline and non-tender. No CVA tenderness.    Skin: No rashes, abrasions, or lacerations.  Neuro: Awake, alert & oriented x 3. Moves all extremities symmetrically. Motor strength 5/5. Sensation grossly intact.

## 2019-08-08 NOTE — ED PROVIDER NOTE - OBJECTIVE STATEMENT
Patient states he has had 2 days of sharp, pleuritic, chest and back pain. Says he recently had hernia repair surgery, subsequently was unable to move due to inflammation and pain of his joints. He saw a rheumatologist who told him he had Lupus and started him of MTX and Prednisone. He notes improvement in his joint pain. Endorses 40lb weight loss, decreased appetite, and one episode of ankle swelling. Denies fever, chills, vomiting, coughing, recent travel.

## 2019-08-08 NOTE — ED ADULT NURSE NOTE - OBJECTIVE STATEMENT
Pt. presents AxOx4 to ED complaining of SOB. Pt. states he has a recent dx of lupus, put on prednisone q. daily. Pt. states "my heart rate is always above 100" since starting prednisone. Pt. states his main complaint is his back pain, "it hurts so bad, I can't take a deep breath in." Pt. states the pain is waxing and waning, currently rates as a 3/10 but states at worst it goes to 7/10. Pt. denies difficulty ambulating. Pt. DONNIE in [place].

## 2019-08-08 NOTE — ED CDU PROVIDER DISPOSITION NOTE - CLINICAL COURSE
CT angio chest neg for PE.  Labs demonstrating new anemia; likely iron deficiency/ chronic illness.  Repeat H&H stable.  Echo unremarkable.  advised ferrous sulfate supplement. suggested f/u with [facility] for further recommendations.

## 2019-08-08 NOTE — ED PROVIDER NOTE - ATTENDING CONTRIBUTION TO CARE
I personally saw the patient with the resident, and completed the key components of the history and physical exam. I then discussed the management plan with the resident.    55 y/o M with PMH HTN, HLD, recent dx of SLE on prednisone 10 mg for the past month and recently started on MTX presents complaining of gradually worsening TY x 1 month with 40 lb weight loss, loss of appetite and generalized malaise. He denies melena or rectal bleeding.    Exam: NAD, pale, appears tachypneic and uncomfortable, tachycardic, lungs CTA B/L, abd soft, NT/ND, no LE edema    Will evaluate for possible PE, UGIB in the face of almost 4 point Hgb drop in the past month, vs. cardiomyopathy from steroid use. Guaiac negative, however patient could be having intermittent GI bleeding and not bleeding at this time. Will plan to have cardiology and hematology evaluate the patient and place in observation for echo and hematology to see.

## 2019-08-08 NOTE — ED CDU PROVIDER INITIAL DAY NOTE - PROGRESS NOTE DETAILS
Cardiology consult appreciated, pending ECHO spoke to Ludivina SOLANO with [facility] (rheum) who reccomends discontinuing methotrexate, went over results of CTA negative for PE, +pleural effusion, has follow up with office Aug 13. repeat CBC stable, patient declined blood transfusion

## 2019-08-08 NOTE — ED PROVIDER NOTE - CLINICAL SUMMARY MEDICAL DECISION MAKING FREE TEXT BOX
Patient worked up with basic labs and CTA chest. CBC revealed anemia. FOBT negative. CTA chest fairly unremarkable. 1 Unit of blood recommended by hematology pending a normal ejection fraction. Cardiac ultrasound to be performed later today. Patient to CDU.

## 2019-08-08 NOTE — ED PROVIDER NOTE - PROGRESS NOTE DETAILS
[place], Attending MD: I discussed with Dr. Eddy of hematology who agrees with iron studies, cardiology eval, recommends outpatient follow up with GI and hematology if GI work up is negative. She recommends transfusion of 1U pRBC if patient is symptomatic and has good EF. Patient will be placed in observation for echo and transfusion.

## 2019-08-08 NOTE — CONSULT NOTE ADULT - SUBJECTIVE AND OBJECTIVE BOX
[facility]                                   [address] [place] [address]                                                      PHONE: [phone]                                                         FAX: [phone]  [email or web address]  ---------------------------------------------------------------------------------------------------------------------------------    Reason for Consult: chest pain     HPI:  BALJEET MCCORMICK is an 54y Male with history of HLD HLD ex smoker SLE recently treated with steroids ~ 2 months with methotrexate ~ two days who presents with pleuritic chest pain sharp 5/10 none exertional associated with TY.  Denies any orthopnea PND or any other symptoms currently.       PAST MEDICAL & SURGICAL HISTORY:  Lupus  High triglycerides  Mixed hyperlipidemia  Essential hypertension  H/O hernia repair  Nasal sinus polyp: removed  History of Achilles tendon repair      No Known Allergies      MEDICATIONS  (STANDING):    MEDICATIONS  (PRN):      Social History:  Cigarettes:     ex smoker               Alchohol:           occ    Illicit Drug Abuse:      ROS: Negative other than as mentioned in HPI.    Vital Signs Last 24 Hrs  T(C): 36.8 (08 Aug 2019 04:56), Max: 36.8 (08 Aug 2019 04:56)  T(F): 98.3 (08 Aug 2019 04:56), Max: 98.3 (08 Aug 2019 04:56)  HR: 103 (08 Aug 2019 07:04) (103 - 125)  BP: 122/80 (08 Aug 2019 04:56) (122/80 - 122/80)  BP(mean): --  RR: 16 (08 Aug 2019 07:04) (16 - 20)  SpO2: 98% (08 Aug 2019 07:04) (95% - 98%)  ICU Vital Signs Last 24 Hrs  BALJEET MCCORMICK  I&O's Detail    I&O's Summary    Drug Dosing Weight  BALJEET MCCORMICK      PHYSICAL EXAM:  General: Appears well developed, well nourished alert and cooperative.  HEENT: Head; normocephalic, atraumatic.  Eyes: Pupils reactive, cornea wnl.  Neck: Supple, no nodes adenopathy, no NVD or carotid bruit or thyromegaly.  CARDIOVASCULAR: Normal S1 and S2, No murmur, rub, gallop or lift.   LUNGS: No rales, rhonchi or wheeze. Normal breath sounds bilaterally.  ABDOMEN: Soft, nontender without mass or organomegaly. bowel sounds normoactive.  EXTREMITIES: No clubbing, cyanosis or edema. Distal pulses wnl.   SKIN: warm and dry with normal turgor.  NEURO: Alert/oriented x 3/normal motor exam. No pathologic reflexes.    PSYCH: normal affect.        LABS:                        8.6    7.39  )-----------( 383      ( 08 Aug 2019 07:23 )             27.3     08-08    134<L>  |  101  |  21.0<H>  ----------------------------<  111  4.0   |  24.0  |  0.76    Ca    9.6      08 Aug 2019 07:23  Mg     1.6     08-08    TPro  6.2<L>  /  Alb  2.7<L>  /  TBili  0.4  /  DBili  x   /  AST  19  /  ALT  22  /  AlkPhos  115  08-08    BALJEET MCCORMICK  CARDIAC MARKERS ( 08 Aug 2019 07:23 )  x     / <0.01 ng/mL / x     / x     / x          PT/INR - ( 08 Aug 2019 07:23 )   PT: 14.0 sec;   INR: 1.21 ratio         PTT - ( 08 Aug 2019 07:23 )  PTT:27.7 sec      RADIOLOGY & ADDITIONAL STUDIES:    INTERPRETATION OF TELEMETRY (personally reviewed):    ECG: NSR without ischemic changes     ECHO: pending     STRESS TEST: none     CARDIAC CATHETERIZATION: none     IMPRESSION:    No evidence of pulmonary embolism.   Bilateral lower lobe RIGHT middle lobe areas of linear atelectasis as   described.  Mild LEFT pleural effusion.    Assessment and Plan:  In summary, BALJEET MCCORMICK is an 54y Male with past medical history significant for 54y Male with history of HLD HLD ex smoker SLE recently treated with steroids ~ 2 months with methotrexate ~ two days who presents with pleuritic chest pain sharp 5/10 none exertional associated with TY.  Chest pain atypical EKG NSR without ischemic changes trop negative thus far; CTA negative for PE mild left plural effusion bilateral atelectasis; volume status stable ; TY likely due to anemia Hg 12 down to 8 ~ three weeks occult blood feces negative     Plan  1.  Check TTE to evaluate LV EF and rule out any significant valvular disease   2.  Serial trop   3.  Iron studies   4.  Consider Hematology consult   5.  IF the above CV work up negative then out patient ischemic evaluations     D/W with ED

## 2019-08-08 NOTE — ED PROVIDER NOTE - NS ED ROS FT
General: Denies fever, chills  HEENT: Denies blurry vision, sore throat  Neck: Denies neck pain, neck stiffness  Resp: Denies coughing, SOB  Cardiovascular: Endorses CP, LE edema. Denies palpitations  GI: Denies nausea, vomiting, abdominal pain

## 2019-08-08 NOTE — ED CDU PROVIDER INITIAL DAY NOTE - OBJECTIVE STATEMENT
This is a 54 year old male with pmhx of HTN, HLDA, pshx of hernia repair (June 8) c/o sharp mid lower back pain x 3 days, worse with taking deep breaths, feels as though cannot full exhale without being out of breath.  He notes was recently seen in ED x June with joint pain, weight loss and night sweats.  He reports followed up with [facility] in [place] and was tested positive for LUPUS.  He admits to starting regimen of prednisone x 30 days, states on tapered dose, now taking 15mg daily.  He admits to taking Methotrexate 2.5mg x 5 pills weekly, started last friday.    He admits to having hernia surgery with [facility] May 9th. He reports procedure was initally going to be done laparoscopic and had to be converted to open, because he states "had golf ball size hernia."  He reports was not told had blood loss during that procedure and denies any h/o blood transfusions.  He notes June 8th developed joint pain, weight loss and night sweats.  He admits to weight loss of 40lbs unintentional since May.  He states weighted 228lbs now 186lbs.  Patient is an ex smoker.      PCP MD Francois  Rheumatologist [facility]  Surgeon Laura

## 2019-08-08 NOTE — ED ADULT NURSE NOTE - CAS EDN DISCHARGE ASSESSMENT
Awake/Symptoms improved/Patient baseline mental status/Dressing clean and dry/Alert and oriented to person, place and time

## 2019-08-08 NOTE — ED ADULT NURSE NOTE - NSIMPLEMENTINTERV_GEN_ALL_ED
Implemented All Universal Safety Interventions:  [place] to call system. Call bell, personal items and telephone within reach. Instruct patient to call for assistance. Room bathroom lighting operational. Non-slip footwear when patient is off stretcher. Physically safe environment: no spills, clutter or unnecessary equipment. Stretcher in lowest position, wheels locked, appropriate side rails in place.

## 2019-08-08 NOTE — ED ADULT NURSE REASSESSMENT NOTE - GENERAL PATIENT STATE
improvement verbalized/comfortable appearance/cooperative/resting/sleeping/family/SO at bedside/smiling/interactive

## 2019-08-13 ENCOUNTER — APPOINTMENT (OUTPATIENT)
Dept: RHEUMATOLOGY | Facility: CLINIC | Age: 54
End: 2019-08-13

## 2020-01-21 NOTE — ED PROVIDER NOTE - CCCP TRG CHIEF CMPLNT
"Pt off bedrest bedrest, groin sites CDI no hematoma noted. Pt ate, voided and walked. Pt denies pain, discomfort. AVSS,   BP (!) 146/83   Pulse 68   Temp 97.4  F (36.3  C) (Oral)   Resp 16   Ht 1.791 m (5' 10.5\")   Wt 88 kg (194 lb)   SpO2 100%   BMI 27.44 kg/m      " shortness of breath

## 2020-02-11 ENCOUNTER — TRANSCRIPTION ENCOUNTER (OUTPATIENT)
Age: 55
End: 2020-02-11

## 2020-04-07 NOTE — DISCHARGE NOTE ADULT - VISION (WITH CORRECTIVE LENSES IF THE PATIENT USUALLY WEARS THEM):
Note      Spoke to patient regarding below.  Patient was informed of his PSA results as being 6.50 ng/mL.  Patient was informed to make a follow-up appointment in 6 months with a PSA.  Patient was informed to do PSA one week prior to his appointment and to refrain from sexual activity 3 to 5 days prior to his PSA lab draw.  Patient states once he take a look at his schedule, he will give our office a call to schedule his appointment.  Patient is aware and states understanding.         ----- Message from Jenaro Galvan MD sent at 4/3/2020 11:39 AM CDT -----  Call for results of PSA (pending on 4/6/2020):  If PSA less than 7.5 ng/mL then see in 6 months with PSA.  If PSA greater than 7.5 ng/mL then order MRI of prostate                       From: Americo Rojas  To: Jenaro Galvan MD  Sent: 4/7/2020  9:22 AM CDT  Subject: Lab Test or Test Related Question    I’m,looking for test result for my psa.  I came to the lab and drawn a extra vile.  Thanks Americo rojas.    
Normal vision: sees adequately in most situations; can see medication labels, newsprint

## 2020-05-13 ENCOUNTER — TRANSCRIPTION ENCOUNTER (OUTPATIENT)
Age: 55
End: 2020-05-13

## 2020-10-13 ENCOUNTER — TRANSCRIPTION ENCOUNTER (OUTPATIENT)
Age: 55
End: 2020-10-13

## 2020-11-12 ENCOUNTER — TRANSCRIPTION ENCOUNTER (OUTPATIENT)
Age: 55
End: 2020-11-12

## 2021-02-06 ENCOUNTER — TRANSCRIPTION ENCOUNTER (OUTPATIENT)
Age: 56
End: 2021-02-06

## 2021-12-13 ENCOUNTER — TRANSCRIPTION ENCOUNTER (OUTPATIENT)
Age: 56
End: 2021-12-13

## 2022-01-31 NOTE — ED ADULT NURSE NOTE - NSSUSCREENINGQ2_ED_ALL_ED
1. Cbc and therapeutic phlebotomy at blood bank months x 3 2. See me 1 week after 3rd phlebotomy with cbc No

## 2022-04-26 ENCOUNTER — TRANSCRIPTION ENCOUNTER (OUTPATIENT)
Age: 57
End: 2022-04-26

## 2022-08-31 NOTE — ED ADULT NURSE NOTE - CCCP TRG CHIEF CMPLNT
General Surgery  Resident History and Physical Note    Reason for Consult: acute cholecystitis     History of Present Illness:   Fate Duverney is a 63 y.o. male with Hx of HTN, atypical chest pain, and chronic back pain s/p lumbar fusion '17 and cervical fusion 6/30/22. He presents to [place] [address] ED with complaints of abdominal pain. He first noticed R flank discomfort on Monday after eating. He states that the meal was a cheese burger. This pain did not continue and was the first instance he had ever noticed it occurring. Yesterday he had oatmeal and the pain occurred again. At the time the pain was mild and uncomfortable and limited to the R flank. This AM around 0200 he states that a sharp stabbing pain woke him up. This pain was different from the previous episodes and he has not ever experienced anything like it. He states that he experienced an episode of nausea with vomiting an hour after the onset of pain this AM. The pain has been constant since that time and is concentrated in the RUQ. He states that there is pain everywhere in the abdomin in a belt like pattern, but it is most significant in the RUQ. He endorses nausea. He denies SOB, chest pain, changes in bowel habits, or problems with urination. Prior to this event the patient has had chest pains for several months. He is being followed by Dr. Florin Mejia with cardiology. He had a normal colonoscopy Jan '22.      Past Medical History:        Diagnosis Date    Arthritis     neck, back    Arthropathy     TISHA positive    Back pain     Depression     Gout     Hyperlipidemia     Hypertension     Wears dentures        Past Surgical History:        Procedure Laterality Date    ARM SURGERY Left 9/22/2020    LEFT ULNAR NERVE DECOMPRESSION (AT ELBOW) AND LEFT CARPAL TUNNEL RELEASE performed by Alec Rosen MD at [place] [address] Right 10/13/2020    RIGHT ULNAR NERVE DECOMPRESSION AT ELBOW AND RIGHT CARPAL TUNNEL RELEASE performed by Alec Rosen MD at [facility] OR    BACK SURGERY      2015    CERVICAL FUSION N/A 6/30/2022    C4-T1 ANTERIOR CERVICAL DISCECTOMY AND FUSION performed by Christine Anderson. Fredy Magana MD at [address]      COLONOSCOPY N/A 12/10/2021    COLONOSCOPY POLYPECTOMY SNARE/COLD BIOPSY performed by Ishmael Hutchinson MD at [address] N/A 1/12/2022    COLONOSCOPY DIAGNOSTIC performed by Ishmael Hutchinson MD at [address] [place] [address], [place], [address] [place] [address]  2017    PLIF L3-S1 done in [place], [place]       Allergies:  Patient has no known allergies. Medications:   Home Meds  No current facility-administered medications on file prior to encounter. Current Outpatient Medications on File Prior to Encounter   Medication Sig Dispense Refill    tiZANidine (ZANAFLEX) 4 MG tablet Take 1 tablet by mouth every 6 hours as needed (prn) Take 4 mg by mouth every 6 hours as needed 120 tablet 1    atorvastatin (LIPITOR) 20 MG tablet Take 1 tablet by mouth in the morning. To prevent heart disease. 90 tablet 1    aspirin EC 81 MG EC tablet Take 1 tablet by mouth in the morning. 90 tablet 1    nitroGLYCERIN (NITROSTAT) 0.4 MG SL tablet Place 1 tablet under the tongue every 5 minutes as needed for Chest pain up to max of 3 total doses. If no relief after 1 dose, call 911. 25 tablet 3    hydrALAZINE (APRESOLINE) 100 MG tablet Take one tablet   Twice a day for blood pressure 60 tablet 1    sennosides-docusate sodium (SENOKOT-S) 8.6-50 MG tablet Take 2 tablets by mouth daily as needed for Constipation      amLODIPine (NORVASC) 10 MG tablet Take 1 tablet by mouth daily 90 tablet 1    lisinopril (PRINIVIL;ZESTRIL) 40 MG tablet Take 1 tablet by mouth daily 90 tablet 1    allopurinol (ZYLOPRIM) 300 MG tablet Take 1 tablet by mouth daily For arthritis stop allopurinol 100 mg 90 tablet 1       Current Meds  No current facility-administered medications for this encounter.       Family History:   Family History   Problem Relation Age of Onset    Cancer shortness of breath Mother     Hypertension Mother     Diabetes Maternal Uncle        Social History:   TOBACCO:   reports that he has never smoked. He has never used smokeless tobacco.  ETOH:   reports no history of alcohol use. DRUGS:   reports no history of drug use. Review of Systems:     Constitutional: Negative. HENT: Negative. Eyes: Negative. Respiratory: Negative. Cardiovascular: Negative. Gastrointestinal: Negative except for above  Genitourinary: Negative. Musculoskeletal: Negative. Skin: Negative. Endocrine: Negative. Allergic/Immunologic: Negative. Neurological: Negative. Hematological: Negative. Psychiatric/Behavioral: Negative. Physical exam:    Vitals:    08/31/22 0842   BP: (!) 167/97   Pulse: 91   Resp: 24   Temp: 98.6 °F (37 °C)   TempSrc: Oral   SpO2: 100%   Weight: 216 lb (98 kg)   Height: 6' 5\" (1.956 m)       General appearance: alert, fetal position, grabbing stomach in pain, grooming appropriate  Eyes: PERRL, no scleral icterus  Neck: trachea midline, no JVD, cervical collar in place   Chest/Lungs: normal effort, no adventitious breathing, no accessory muscle use, on RA  Cardiovascular: RRR  Abdomen: soft, diffusely tender most significant in RUQ and epigastric region, non-distended, no guarding/rigidity, Muñoz's sign positive  Skin: warm and dry, no rashes  Extremities: no edema, no cyanosis  Neuro: A&Ox3, no focal deficits, sensation intact    Labs:    CBC:   Recent Labs     08/31/22  0931   WBC 10.9   HGB 13.4*   HCT 41.8   MCV 80.3        BMP:   Recent Labs     08/31/22  0931 08/31/22  1100   *  --    K 6.3* 3.8   CL 98*  --    CO2 20*  --    BUN 9  --    CREATININE 0.9  --      PT/INR: No results for input(s): PROTIME, INR in the last 72 hours. APTT: No results for input(s): APTT in the last 72 hours.   Liver Profile:   Lab Results   Component Value Date/Time    AST 39 08/31/2022 09:31 AM    ALT 18 08/31/2022 09:31 AM    BILITOT 0.7 08/31/2022 09:31 AM    ALKPHOS 77 08/31/2022 09:31 AM     Lab Results   Component Value Date/Time    CHOL 172 11/12/2019 09:07 AM    HDL 45 08/08/2022 12:47 PM    TRIG 139 11/12/2019 09:07 AM     UA:   Lab Results   Component Value Date/Time    COLORU Yellow 08/31/2022 12:12 PM    PHUR 8.0 08/31/2022 12:12 PM    CLARITYU Clear 08/31/2022 12:12 PM    SPECGRAV 1.010 08/31/2022 12:12 PM    LEUKOCYTESUR Negative 08/31/2022 12:12 PM    UROBILINOGEN 1.0 08/31/2022 12:12 PM    BILIRUBINUR Negative 08/31/2022 12:12 PM    BLOODU Negative 08/31/2022 12:12 PM    GLUCOSEU Negative 08/31/2022 12:12 PM       Imaging:   CT ABDOMEN PELVIS W IV CONTRAST Additional Contrast? None   Final Result      Findings consistent with acute cholecystitis. Ductal dilatation is visualized but there is no obvious choledocholithiasis on CT scan. Mild prostatomegaly      Moderate-sized hiatal hernia      XR CHEST PORTABLE   Final Result      No acute pulmonary pathology                        Assessment/Plan: This is a 63 y.o. male with Hx of HTN, atypical chest pain and chronic back pain. He presents with acute abdominal pain for 12 hours. Work up has been indicative of acute cholecystitis thus far. Patient is afebrile, hemodynamically stable. Labs ar significant for LA of 3.0. Imaging shows acute cholecystitis with ductal dilation without obvious choledocholithiasis. Currently patient is uncomfortable but stable. Given that the labs, imaging, and physical exam show significant concern for acute cholecystitis, surgical intervention is indicted at this time.     - OR today  - will preop and consent patient  - send preop labs  - CXR today shows no acute pulmonary pathology  - EKG in ED today, appears sinus rhythm with no acute changes  - ancef OCTOR  - NPO until after surgery  - IVF   - The patient has findings consistent with acute cholecystitis. As a result, we will proceed with laparoscopic cholecystectomy.  The risks, benefits, and expected recovery were discussed with the patient and he wishes to proceed. Patient was seen with senior resident and with Dr. Yun Woods DO  PGY1, General Surgery  08/31/22  1:12 PM  [place]  Pager: [phone]    I have seen, examined, and reviewed the patients chart. I agree with the residents assessment and have made appropriate changes.     Alley Rhodes

## 2022-12-01 ENCOUNTER — INPATIENT (INPATIENT)
Facility: HOSPITAL | Age: 57
LOS: 1 days | Discharge: HOME | DRG: 247 | End: 2022-12-03
Attending: STUDENT IN AN ORGANIZED HEALTH CARE EDUCATION/TRAINING PROGRAM | Admitting: STUDENT IN AN ORGANIZED HEALTH CARE EDUCATION/TRAINING PROGRAM
Payer: COMMERCIAL

## 2022-12-01 VITALS
TEMPERATURE: 98 F | DIASTOLIC BLOOD PRESSURE: 95 MMHG | OXYGEN SATURATION: 94 % | RESPIRATION RATE: 18 BRPM | HEART RATE: 111 BPM | SYSTOLIC BLOOD PRESSURE: 169 MMHG

## 2022-12-01 DIAGNOSIS — Z82.3 FAMILY HISTORY OF STROKE: ICD-10-CM

## 2022-12-01 DIAGNOSIS — Z98.890 OTHER SPECIFIED POSTPROCEDURAL STATES: Chronic | ICD-10-CM

## 2022-12-01 DIAGNOSIS — J33.8 OTHER POLYP OF SINUS: Chronic | ICD-10-CM

## 2022-12-01 LAB
ALBUMIN SERPL ELPH-MCNC: 4.4 G/DL — SIGNIFICANT CHANGE UP (ref 3.3–5.2)
ALP SERPL-CCNC: 61 U/L — SIGNIFICANT CHANGE UP (ref 40–120)
ALT FLD-CCNC: 38 U/L — SIGNIFICANT CHANGE UP
ANION GAP SERPL CALC-SCNC: 12 MMOL/L — SIGNIFICANT CHANGE UP (ref 5–17)
AST SERPL-CCNC: 27 U/L — SIGNIFICANT CHANGE UP
BASOPHILS # BLD AUTO: 0.04 K/UL — SIGNIFICANT CHANGE UP (ref 0–0.2)
BASOPHILS NFR BLD AUTO: 0.7 % — SIGNIFICANT CHANGE UP (ref 0–2)
BILIRUB SERPL-MCNC: 0.5 MG/DL — SIGNIFICANT CHANGE UP (ref 0.4–2)
BUN SERPL-MCNC: 15.3 MG/DL — SIGNIFICANT CHANGE UP (ref 8–20)
CALCIUM SERPL-MCNC: 9.8 MG/DL — SIGNIFICANT CHANGE UP (ref 8.4–10.5)
CHLORIDE SERPL-SCNC: 102 MMOL/L — SIGNIFICANT CHANGE UP (ref 96–108)
CO2 SERPL-SCNC: 24 MMOL/L — SIGNIFICANT CHANGE UP (ref 22–29)
CREAT SERPL-MCNC: 1.24 MG/DL — SIGNIFICANT CHANGE UP (ref 0.5–1.3)
D DIMER BLD IA.RAPID-MCNC: <150 NG/ML DDU — SIGNIFICANT CHANGE UP
EGFR: 67 ML/MIN/1.73M2 — SIGNIFICANT CHANGE UP
EOSINOPHIL # BLD AUTO: 0.1 K/UL — SIGNIFICANT CHANGE UP (ref 0–0.5)
EOSINOPHIL NFR BLD AUTO: 1.7 % — SIGNIFICANT CHANGE UP (ref 0–6)
GLUCOSE SERPL-MCNC: 228 MG/DL — HIGH (ref 70–99)
HCT VFR BLD CALC: 40.7 % — SIGNIFICANT CHANGE UP (ref 39–50)
HGB BLD-MCNC: 14.8 G/DL — SIGNIFICANT CHANGE UP (ref 13–17)
IMM GRANULOCYTES NFR BLD AUTO: 1 % — HIGH (ref 0–0.9)
LIDOCAIN IGE QN: 37 U/L — SIGNIFICANT CHANGE UP (ref 22–51)
LYMPHOCYTES # BLD AUTO: 0.63 K/UL — LOW (ref 1–3.3)
LYMPHOCYTES # BLD AUTO: 10.8 % — LOW (ref 13–44)
MCHC RBC-ENTMCNC: 31.6 PG — SIGNIFICANT CHANGE UP (ref 27–34)
MCHC RBC-ENTMCNC: 36.4 GM/DL — HIGH (ref 32–36)
MCV RBC AUTO: 86.8 FL — SIGNIFICANT CHANGE UP (ref 80–100)
MONOCYTES # BLD AUTO: 0.37 K/UL — SIGNIFICANT CHANGE UP (ref 0–0.9)
MONOCYTES NFR BLD AUTO: 6.3 % — SIGNIFICANT CHANGE UP (ref 2–14)
NEUTROPHILS # BLD AUTO: 4.65 K/UL — SIGNIFICANT CHANGE UP (ref 1.8–7.4)
NEUTROPHILS NFR BLD AUTO: 79.5 % — HIGH (ref 43–77)
NT-PROBNP SERPL-SCNC: 10 PG/ML — SIGNIFICANT CHANGE UP (ref 0–300)
PLATELET # BLD AUTO: 207 K/UL — SIGNIFICANT CHANGE UP (ref 150–400)
POTASSIUM SERPL-MCNC: 4.1 MMOL/L — SIGNIFICANT CHANGE UP (ref 3.5–5.3)
POTASSIUM SERPL-SCNC: 4.1 MMOL/L — SIGNIFICANT CHANGE UP (ref 3.5–5.3)
PROT SERPL-MCNC: 7 G/DL — SIGNIFICANT CHANGE UP (ref 6.6–8.7)
RBC # BLD: 4.69 M/UL — SIGNIFICANT CHANGE UP (ref 4.2–5.8)
RBC # FLD: 12.7 % — SIGNIFICANT CHANGE UP (ref 10.3–14.5)
SODIUM SERPL-SCNC: 138 MMOL/L — SIGNIFICANT CHANGE UP (ref 135–145)
TROPONIN T SERPL-MCNC: <0.01 NG/ML — SIGNIFICANT CHANGE UP (ref 0–0.06)
WBC # BLD: 5.85 K/UL — SIGNIFICANT CHANGE UP (ref 3.8–10.5)
WBC # FLD AUTO: 5.85 K/UL — SIGNIFICANT CHANGE UP (ref 3.8–10.5)

## 2022-12-01 PROCEDURE — 93010 ELECTROCARDIOGRAM REPORT: CPT

## 2022-12-01 PROCEDURE — 71045 X-RAY EXAM CHEST 1 VIEW: CPT | Mod: 26

## 2022-12-01 PROCEDURE — 99285 EMERGENCY DEPT VISIT HI MDM: CPT

## 2022-12-01 PROCEDURE — 99223 1ST HOSP IP/OBS HIGH 75: CPT

## 2022-12-01 RX ORDER — FOLIC ACID 0.8 MG
1 TABLET ORAL
Qty: 0 | Refills: 0 | DISCHARGE

## 2022-12-01 RX ORDER — ONDANSETRON 8 MG/1
4 TABLET, FILM COATED ORAL ONCE
Refills: 0 | Status: COMPLETED | OUTPATIENT
Start: 2022-12-01 | End: 2022-12-01

## 2022-12-01 RX ORDER — FAMOTIDINE 10 MG/ML
20 INJECTION INTRAVENOUS ONCE
Refills: 0 | Status: COMPLETED | OUTPATIENT
Start: 2022-12-01 | End: 2022-12-01

## 2022-12-01 RX ORDER — ASPIRIN/CALCIUM CARB/MAGNESIUM 324 MG
81 TABLET ORAL DAILY
Refills: 0 | Status: DISCONTINUED | OUTPATIENT
Start: 2022-12-01 | End: 2022-12-03

## 2022-12-01 RX ORDER — SIMVASTATIN 20 MG/1
1 TABLET, FILM COATED ORAL
Qty: 0 | Refills: 0 | DISCHARGE

## 2022-12-01 RX ORDER — LOSARTAN POTASSIUM 100 MG/1
100 TABLET, FILM COATED ORAL DAILY
Refills: 0 | Status: DISCONTINUED | OUTPATIENT
Start: 2022-12-01 | End: 2022-12-03

## 2022-12-01 RX ORDER — HYDROXYCHLOROQUINE SULFATE 200 MG
200 TABLET ORAL
Refills: 0 | Status: DISCONTINUED | OUTPATIENT
Start: 2022-12-01 | End: 2022-12-03

## 2022-12-01 RX ORDER — PANTOPRAZOLE SODIUM 20 MG/1
40 TABLET, DELAYED RELEASE ORAL
Refills: 0 | Status: DISCONTINUED | OUTPATIENT
Start: 2022-12-01 | End: 2022-12-03

## 2022-12-01 RX ORDER — ATORVASTATIN CALCIUM 80 MG/1
80 TABLET, FILM COATED ORAL AT BEDTIME
Refills: 0 | Status: DISCONTINUED | OUTPATIENT
Start: 2022-12-01 | End: 2022-12-03

## 2022-12-01 RX ORDER — FENOFIBRATE,MICRONIZED 130 MG
145 CAPSULE ORAL DAILY
Refills: 0 | Status: DISCONTINUED | OUTPATIENT
Start: 2022-12-01 | End: 2022-12-03

## 2022-12-01 RX ORDER — ONDANSETRON 8 MG/1
4 TABLET, FILM COATED ORAL EVERY 6 HOURS
Refills: 0 | Status: DISCONTINUED | OUTPATIENT
Start: 2022-12-01 | End: 2022-12-03

## 2022-12-01 RX ORDER — AMLODIPINE BESYLATE 2.5 MG/1
5 TABLET ORAL DAILY
Refills: 0 | Status: DISCONTINUED | OUTPATIENT
Start: 2022-12-01 | End: 2022-12-03

## 2022-12-01 RX ORDER — MYCOPHENOLATE MOFETIL 250 MG/1
500 CAPSULE ORAL DAILY
Refills: 0 | Status: DISCONTINUED | OUTPATIENT
Start: 2022-12-01 | End: 2022-12-03

## 2022-12-01 RX ORDER — ACETAMINOPHEN 500 MG
650 TABLET ORAL EVERY 6 HOURS
Refills: 0 | Status: DISCONTINUED | OUTPATIENT
Start: 2022-12-01 | End: 2022-12-03

## 2022-12-01 RX ADMIN — Medication 30 MILLILITER(S): at 18:26

## 2022-12-01 RX ADMIN — ONDANSETRON 4 MILLIGRAM(S): 8 TABLET, FILM COATED ORAL at 18:28

## 2022-12-01 RX ADMIN — FAMOTIDINE 20 MILLIGRAM(S): 10 INJECTION INTRAVENOUS at 18:27

## 2022-12-01 NOTE — ED PROVIDER NOTE - NSICDXPASTSURGICALHX_GEN_ALL_CORE_FT
PAST SURGICAL HISTORY:  H/O hernia repair     History of Achilles tendon repair     Nasal sinus polyp removed

## 2022-12-01 NOTE — ED PROVIDER NOTE - NSICDXFAMILYHX_GEN_ALL_CORE_FT
FAMILY HISTORY:  Family history of hyperlipidemia  Family history of stroke    Father  Still living? No  Family history of diabetes mellitus, Age at diagnosis: Age Unknown

## 2022-12-01 NOTE — ED ADULT NURSE REASSESSMENT NOTE - BREATHING
[employer] from [employer] in [place] called requesting missing information for patent DME switch.     Order with Supporting documents have been faxed to [facility] [phone] Attn to Hair  
spontaneous

## 2022-12-01 NOTE — ED PROVIDER NOTE - ATTENDING CONTRIBUTION TO CARE
I, Puma Garcia, performed a face to face bedside interview with this patient regarding history of present illness, review of symptoms and relevant past medical, social and family history.  I completed an independent physical examination. I have communicated the patient’s plan of care and disposition with the resident.  57 year old male with PMH CAD (50% LAD lesion on cath 10 years ago), HTn, HLD, borderline Dm presents with chest pain and palpitations. Pt reports that he has having episodes of his heart pounding and fluttering in his chest, intermittent. Associated with a dull chest pain and SOB.   Gen: NAD, well appearing  CV: RRR  Pul: CTA b/l  Abd: Soft, non-distended, non-tender  Neuro: no focal deficits  Pt to be admitted for cath

## 2022-12-01 NOTE — H&P ADULT - HISTORY OF PRESENT ILLNESS
57yoM hx non-obstructive CAD, SLE, HTN, HLD presenting with 2 weeks of intermittent chest pain. Pt reports burning like sensation in epigastric and lower chest area that often radiates to his left shoulder and is associated with palpitations and dyspnea.  The chest pain episodes can for 15-20 seconds at a time and occur with exertion and rest.  Pt reports cardiac cath about 1 decade ago that showed a 50% occlusion in LAD without any PCI done. He also reports having abnormal stress testing (both exercise and nuclear) within the past few years.   He states that he underwent a cardiac MRI after the abnormal stress testing and was advised to have no further testing at the time.

## 2022-12-01 NOTE — ED PROVIDER NOTE - OBJECTIVE STATEMENT
56 y/o M pt w/ PMHx of Lupus, HTN, HLD presents to ED w/ epigastric pain radiating to the chest, SOB and near syncope. Pt reports intermittent palpitations for the last week as well as epigastric burning sensation. Today he was in the shower when he had epigastric pain radiating to the chest w/ lightheadedness and SOB. He did not lose consciousness or fall to the ground. He denies leg swelling, numbness, tingling, focal weakness, N/V/D, cough, hemoptysis, fever, chills, or any other complaints. Pt reports past cardiac cath, no stents, 50% LAD occlusion

## 2022-12-01 NOTE — H&P ADULT - NSICDXFAMILYHX_GEN_ALL_CORE_FT
FAMILY HISTORY:  Family history of hyperlipidemia  Family history of stroke, father    Father  Still living? No  Family history of diabetes mellitus, Age at diagnosis: Age Unknown

## 2022-12-01 NOTE — H&P ADULT - NSHPPHYSICALEXAM_GEN_ALL_CORE
Vital Signs Last 24 Hrs  T(C): 37.4 (02 Dec 2022 00:10), Max: 37.4 (02 Dec 2022 00:10)  T(F): 99.3 (02 Dec 2022 00:10), Max: 99.3 (02 Dec 2022 00:10)  HR: 80 (02 Dec 2022 00:10) (80 - 111)  BP: 137/91 (02 Dec 2022 00:10) (135/75 - 171/91)  BP(mean): --  RR: 20 (02 Dec 2022 00:10) (15 - 20)  SpO2: 96% (02 Dec 2022 00:10) (94% - 97%)    Parameters below as of 02 Dec 2022 00:10  Patient On (Oxygen Delivery Method): room air    GENERAL:  Well-appearing, not in acute distress  EYES:  Clear conjunctiva, extraocular movement intact  ENT: Moist mucous membranes  RESP:  Non-labored breathing pattern, lungs clear to ausculation   CV: Regular rate and rhythm, no murmurs appreciated, no lower extremity edema  GI: Soft, non-tender, non-distended  NEURO: Awake, alert, conversant, upper and lower extremity strength 5/5, light touch sensation grossly intact  PSYCH: Calm, cooperative  SKIN: No rash or lesions, warm and dry

## 2022-12-01 NOTE — ED PROVIDER NOTE - CLINICAL SUMMARY MEDICAL DECISION MAKING FREE TEXT BOX
epigastric pain radiating to the chest, lightheadedness, SOB. Will get labs, EKG, CXR, pepcid,/maalox, zofran, reassess.

## 2022-12-01 NOTE — CONSULT NOTE ADULT - SUBJECTIVE AND OBJECTIVE BOX
[facility]                                   [address] [place] [address]                                                      PHONE: [phone]                                                         FAX: [phone]  [email or web address]  ---------------------------------------------------------------------------------------------------------------------------------    HPI:  BALJEET MCCORMICK is an 57y Male PMHX nonobstructive CAD at remote cardiac catheterization (50% LAD lesion), systemic lupus erythematosus, anemia of chronic disease currently on CellCept therapy, intermittent prednisone and Plaquenil therapy who presents to [facility] ED with CP.  Pt in shower developed epigastric pain radiating to left shoulder, jaw and associated with sob, sharp, lasting a few mins.     PAST MEDICAL & SURGICAL HISTORY:  Essential hypertension      Mixed hyperlipidemia      High triglycerides      Lupus      History of Achilles tendon repair      Nasal sinus polyp  removed      H/O hernia repair          No Known Allergies      MEDICATIONS  (STANDING):    MEDICATIONS  (PRN):      Family History: Pt denies hx of early cad, SCD, or congenital heart disease.      Social History:  Cigarettes:   former                 Alchohol:   no              Illicit Drug Abuse:  no    ROS:    Extensively Reviewed with pertinents as per HPI the remainder were negative.      Vital Signs Last 24 Hrs  T(C): 36.9 (01 Dec 2022 19:40), Max: 36.9 (01 Dec 2022 19:40)  T(F): 98.5 (01 Dec 2022 19:40), Max: 98.5 (01 Dec 2022 19:40)  HR: 94 (01 Dec 2022 19:40) (94 - 111)  BP: 156/90 (01 Dec 2022 19:40) (147/97 - 171/91)  BP(mean): --  RR: 16 (01 Dec 2022 19:40) (15 - 18)  SpO2: 96% (01 Dec 2022 19:40) (94% - 97%)    Parameters below as of 01 Dec 2022 19:40  Patient On (Oxygen Delivery Method): room air      ICU Vital Signs Last 24 Hrs  BALJEET MCCORMICK  I&O's Detail    I&O's Summary    Drug Dosing Weight  BALJEET MCCORMICK      PHYSICAL EXAM:  General: Appears well developed, well nourished alert and cooperative.  HEENT: Head; normocephalic, atraumatic.  Eyes: Pupils reactive, cornea wnl.  Neck: Supple, no nodes adenopathy, no NVD or carotid bruit or thyromegaly.  CARDIOVASCULAR: Normal S1 and S2, No murmur, rub, gallop or lift.   LUNGS: No rales, rhonchi or wheeze. Normal breath sounds bilaterally.  ABDOMEN: Soft, nontender without mass or organomegaly. bowel sounds normoactive.  EXTREMITIES: No clubbing, cyanosis or edema. Distal pulses wnl.   SKIN: warm and dry with normal turgor.  NEURO: Alert/oriented x 3/normal motor exam. No pathologic reflexes.    PSYCH: normal affect.        LABS:                        14.8   5.85  )-----------( 207      ( 01 Dec 2022 18:10 )             40.7     12-01    138  |  102  |  15.3  ----------------------------<  228<H>  4.1   |  24.0  |  1.24    Ca    9.8      01 Dec 2022 18:10    TPro  7.0  /  Alb  4.4  /  TBili  0.5  /  DBili  x   /  AST  27  /  ALT  38  /  AlkPhos  61  12-01    BALJEET MCCORMICK  CARDIAC MARKERS ( 01 Dec 2022 18:10 )  x     / <0.01 ng/mL / x     / x     / x         RADIOLOGY & ADDITIONAL STUDIES:    INTERPRETATION OF TELEMETRY (personally reviewed):    ECG: pending    Assessment and Plan:  In summary,  BALJEET MCCORMICK is an 57y Male PMHX nonobstructive CAD at remote cardiac catheterization (50% LAD lesion), systemic lupus erythematosus, anemia of chronic disease currently on CellCept therapy, intermittent prednisone and Plaquenil therapy who presents to [facility] ED with CP.  Pt in shower developed epigastric pain radiating to left shoulder, jaw and associated with sob, sharp, lasting a few mins.     CP  -trend trops/ekgs  -tele  -echo  -monitor overnight  -plan for cardiac cath in am     Thank you for allowing [facility] to participate in the care of this patient.  Please feel free to call with any questions or concerns.

## 2022-12-01 NOTE — ED ADULT NURSE NOTE - CHIEF COMPLAINT QUOTE
Patient with chest pain x1 day history of blockage but no stenting. c/o headache with a bad taste in his mouth. took 4 baby asa prior to arrival.

## 2022-12-01 NOTE — H&P ADULT - NSHPLABSRESULTS_GEN_ALL_CORE
12-01    138  |  102  |  15.3  ----------------------------<  228<H>  4.1   |  24.0  |  1.24    Ca    9.8      01 Dec 2022 18:10    TPro  7.0  /  Alb  4.4  /  TBili  0.5  /  DBili  x   /  AST  27  /  ALT  38  /  AlkPhos  61  12-01                            14.8   5.85  )-----------( 207      ( 01 Dec 2022 18:10 )             40.7      EKG: sinus tachy, , Q waves in aVR, V1, V2    CXR: no acute pathology noted, no vascular congestion, pleural effusion seen

## 2022-12-01 NOTE — ED ADULT TRIAGE NOTE - CHIEF COMPLAINT QUOTE
Patient with chest pain x1 day history of blockage but no stenting. Patient with chest pain x1 day history of blockage but no stenting. c/o headache with a bad taste in his mouth. took 4 baby asa prior to arrival.

## 2022-12-01 NOTE — H&P ADULT - ASSESSMENT
57yoM hx non-obstructive CAD (50% occlusion in LAD), SLE, HTN, HLD, recent abnormal stress testing, presenting with 2 weeks of intermittent chest pain associated with palpitations and dyspnea     Chest pain with high risk of cardiac etiology  -EKG with Q waves in aVR, V1, V2  -Troponin negative  -Seen by cardiology in ED, plan for cardiac cath in AM  -Serial cardiac enzymes and EKG  -Took 4 baby ASA prior to hospital arrival  -Continue ASA 81mg daily, high potency statin  -TTE ordered  -Telemetry monitoring  -NPO after midnight    Hx SLE  -Hydroxychloroquine 200mg BID  -Mycophenolate 500mg daily     Hx HTN  -Losartan-HCTZ 100mg-12.5mg daily  -Amlodipine 5mg daily    Hx HLD  -Interchange rosuvastatin 20mg to atorvastatin 80mg daily  -Fenofibrate 145mg daily     Prophylactic measure  -Lovenox 40mg daily

## 2022-12-01 NOTE — ED ADULT NURSE NOTE - OBJECTIVE STATEMENT
Pt is a 57YOM who is here for pain in his chest, pt states he was at home and in the shower, he felt like he was having palpitations, pt states that he felt like he was going to pass out and started to get dizzy and light headed, pt states he felt like his heart was pounding in his chest, pt states that he has been worked up in the past for heart problems and was told that he has a blockage in his LAD but it is not significant enough to warrant any stenting, pt states he also had a nuclear stress test in the past, not sure of the results, pt states he also has an upper abdominal hernia, pt complains of shortness of breath, difficulty breathing, chest pain, pt states he felt it radiating throughout his chest. Pt denies any fevers, chills, states he has nausea.

## 2022-12-01 NOTE — ED PROVIDER NOTE - NSICDXPASTMEDICALHX_GEN_ALL_CORE_FT
PAST MEDICAL HISTORY:  Essential hypertension     High triglycerides     Lupus     Mixed hyperlipidemia

## 2022-12-01 NOTE — ED PROVIDER NOTE - PHYSICAL EXAMINATION
Gen: Well appearing in NAD  Head: NC/AT  Neck: trachea midline  Card: tachycardic, regular rhythm. no pedal edema  Resp:  CTAB  Abd: soft, non-distended, non-tender. palpable hernia in the midline  Ext: no deformities above reported baseline  Neuro:  A&O, no motor or sensory deficits above reported baseline  Skin:  Warm and dry as visualized  Psych:  Normal affect and mood

## 2022-12-02 PROBLEM — M32.9 SYSTEMIC LUPUS ERYTHEMATOSUS, UNSPECIFIED: Chronic | Status: ACTIVE | Noted: 2019-08-08

## 2022-12-02 LAB
ANION GAP SERPL CALC-SCNC: 11 MMOL/L — SIGNIFICANT CHANGE UP (ref 5–17)
BUN SERPL-MCNC: 13.8 MG/DL — SIGNIFICANT CHANGE UP (ref 8–20)
CALCIUM SERPL-MCNC: 9.2 MG/DL — SIGNIFICANT CHANGE UP (ref 8.4–10.5)
CHLORIDE SERPL-SCNC: 99 MMOL/L — SIGNIFICANT CHANGE UP (ref 96–108)
CK SERPL-CCNC: 101 U/L — SIGNIFICANT CHANGE UP (ref 30–200)
CK SERPL-CCNC: 102 U/L — SIGNIFICANT CHANGE UP (ref 30–200)
CO2 SERPL-SCNC: 25 MMOL/L — SIGNIFICANT CHANGE UP (ref 22–29)
CREAT SERPL-MCNC: 1.07 MG/DL — SIGNIFICANT CHANGE UP (ref 0.5–1.3)
EGFR: 80 ML/MIN/1.73M2 — SIGNIFICANT CHANGE UP
FLUAV AG NPH QL: SIGNIFICANT CHANGE UP
FLUBV AG NPH QL: SIGNIFICANT CHANGE UP
GLUCOSE SERPL-MCNC: 119 MG/DL — HIGH (ref 70–99)
HCV AB S/CO SERPL IA: 0.14 S/CO — SIGNIFICANT CHANGE UP (ref 0–0.99)
HCV AB SERPL-IMP: SIGNIFICANT CHANGE UP
POTASSIUM SERPL-MCNC: 3.6 MMOL/L — SIGNIFICANT CHANGE UP (ref 3.5–5.3)
POTASSIUM SERPL-SCNC: 3.6 MMOL/L — SIGNIFICANT CHANGE UP (ref 3.5–5.3)
RSV RNA NPH QL NAA+NON-PROBE: SIGNIFICANT CHANGE UP
SARS-COV-2 RNA SPEC QL NAA+PROBE: SIGNIFICANT CHANGE UP
SODIUM SERPL-SCNC: 135 MMOL/L — SIGNIFICANT CHANGE UP (ref 135–145)
TROPONIN T SERPL-MCNC: <0.01 NG/ML — SIGNIFICANT CHANGE UP (ref 0–0.06)
TROPONIN T SERPL-MCNC: <0.01 NG/ML — SIGNIFICANT CHANGE UP (ref 0–0.06)

## 2022-12-02 PROCEDURE — 93306 TTE W/DOPPLER COMPLETE: CPT | Mod: 26

## 2022-12-02 PROCEDURE — 99233 SBSQ HOSP IP/OBS HIGH 50: CPT

## 2022-12-02 PROCEDURE — 93010 ELECTROCARDIOGRAM REPORT: CPT

## 2022-12-02 RX ORDER — ENOXAPARIN SODIUM 100 MG/ML
40 INJECTION SUBCUTANEOUS EVERY 24 HOURS
Refills: 0 | Status: DISCONTINUED | OUTPATIENT
Start: 2022-12-02 | End: 2022-12-02

## 2022-12-02 RX ORDER — AZELASTINE 137 UG/1
2 SPRAY, METERED NASAL
Qty: 0 | Refills: 0 | DISCHARGE

## 2022-12-02 RX ORDER — ROSUVASTATIN CALCIUM 5 MG/1
1 TABLET ORAL
Qty: 0 | Refills: 0 | DISCHARGE

## 2022-12-02 RX ORDER — ZOLPIDEM TARTRATE 10 MG/1
5 TABLET ORAL AT BEDTIME
Refills: 0 | Status: DISCONTINUED | OUTPATIENT
Start: 2022-12-02 | End: 2022-12-03

## 2022-12-02 RX ORDER — HYDROXYCHLOROQUINE SULFATE 200 MG
1 TABLET ORAL
Qty: 0 | Refills: 0 | DISCHARGE

## 2022-12-02 RX ORDER — ASPIRIN/CALCIUM CARB/MAGNESIUM 324 MG
1 TABLET ORAL
Qty: 0 | Refills: 0 | DISCHARGE

## 2022-12-02 RX ORDER — INFLUENZA VIRUS VACCINE 15; 15; 15; 15 UG/.5ML; UG/.5ML; UG/.5ML; UG/.5ML
0.5 SUSPENSION INTRAMUSCULAR ONCE
Refills: 0 | Status: DISCONTINUED | OUTPATIENT
Start: 2022-12-02 | End: 2022-12-03

## 2022-12-02 RX ORDER — OMEPRAZOLE 10 MG/1
1 CAPSULE, DELAYED RELEASE ORAL
Qty: 0 | Refills: 0 | DISCHARGE

## 2022-12-02 RX ORDER — METOPROLOL TARTRATE 50 MG
25 TABLET ORAL DAILY
Refills: 0 | Status: DISCONTINUED | OUTPATIENT
Start: 2022-12-02 | End: 2022-12-03

## 2022-12-02 RX ORDER — MYCOPHENOLATE MOFETIL 250 MG/1
1 CAPSULE ORAL
Qty: 0 | Refills: 0 | DISCHARGE

## 2022-12-02 RX ORDER — FENOFIBRATE,MICRONIZED 130 MG
1 CAPSULE ORAL
Qty: 0 | Refills: 0 | DISCHARGE

## 2022-12-02 RX ORDER — AMLODIPINE BESYLATE 2.5 MG/1
1 TABLET ORAL
Qty: 0 | Refills: 0 | DISCHARGE

## 2022-12-02 RX ORDER — ACETAMINOPHEN 500 MG
1000 TABLET ORAL ONCE
Refills: 0 | Status: DISCONTINUED | OUTPATIENT
Start: 2022-12-02 | End: 2022-12-03

## 2022-12-02 RX ORDER — PRASUGREL 5 MG/1
10 TABLET, FILM COATED ORAL DAILY
Refills: 0 | Status: DISCONTINUED | OUTPATIENT
Start: 2022-12-03 | End: 2022-12-03

## 2022-12-02 RX ORDER — LOSARTAN/HYDROCHLOROTHIAZIDE 100MG-25MG
1 TABLET ORAL
Qty: 0 | Refills: 0 | DISCHARGE

## 2022-12-02 RX ADMIN — Medication 650 MILLIGRAM(S): at 21:20

## 2022-12-02 RX ADMIN — Medication 200 MILLIGRAM(S): at 22:38

## 2022-12-02 RX ADMIN — AMLODIPINE BESYLATE 5 MILLIGRAM(S): 2.5 TABLET ORAL at 05:54

## 2022-12-02 RX ADMIN — Medication 25 MILLIGRAM(S): at 20:17

## 2022-12-02 RX ADMIN — ATORVASTATIN CALCIUM 80 MILLIGRAM(S): 80 TABLET, FILM COATED ORAL at 22:37

## 2022-12-02 RX ADMIN — Medication 81 MILLIGRAM(S): at 09:07

## 2022-12-02 RX ADMIN — Medication 650 MILLIGRAM(S): at 20:21

## 2022-12-02 RX ADMIN — Medication 145 MILLIGRAM(S): at 09:08

## 2022-12-02 RX ADMIN — PANTOPRAZOLE SODIUM 40 MILLIGRAM(S): 20 TABLET, DELAYED RELEASE ORAL at 05:54

## 2022-12-02 RX ADMIN — Medication 200 MILLIGRAM(S): at 05:54

## 2022-12-02 RX ADMIN — LOSARTAN POTASSIUM 100 MILLIGRAM(S): 100 TABLET, FILM COATED ORAL at 05:53

## 2022-12-02 RX ADMIN — MYCOPHENOLATE MOFETIL 500 MILLIGRAM(S): 250 CAPSULE ORAL at 09:08

## 2022-12-02 NOTE — PROGRESS NOTE ADULT - SUBJECTIVE AND OBJECTIVE BOX
Nurse Practitioner Progress note:   s/p [facility] with successful PCI and MICHAEL to the 90% stenosis of the diag and 2 MICHAEL to the pLAD 80% stenosis with Dr Calvin Mike  Patient loaded with Effient 60 mg po in the lab      Patient feels well.  Denies chest pain, shortness of breath, dizziness or palpitations at this time    Pt A&O x 3  Lungs CTA   S1S2 no MRG  Telemetry Sinus Rhythm  Right radial hemoband in place.  Procedure site CDI, no bleeding, no hematoma, able to move all digits with capillary refill < 3 seconds, fingers warm      T(C): 36.7 (12-02-22 @ 16:50), Max: 37.4 (12-02-22 @ 00:10)  HR: 73 (12-02-22 @ 16:50) (65 - 94)  BP: 162/91 (12-02-22 @ 16:50) (116/71 - 162/91)  RR: 18 (12-02-22 @ 16:50) (15 - 20)  SpO2: 95% (12-02-22 @ 16:50) (95% - 97%)    CBC Full  -  ( 01 Dec 2022 18:10 )  WBC Count : 5.85 K/uL  RBC Count : 4.69 M/uL  Hemoglobin : 14.8 g/dL  Hematocrit : 40.7 %  Platelet Count - Automated : 207 K/uL  Mean Cell Volume : 86.8 fl  Mean Cell Hemoglobin : 31.6 pg  Mean Cell Hemoglobin Concentration : 36.4 gm/dL  Auto Neutrophil # : 4.65 K/uL  Auto Lymphocyte # : 0.63 K/uL  Auto Monocyte # : 0.37 K/uL  Auto Eosinophil # : 0.10 K/uL  Auto Basophil # : 0.04 K/uL  Auto Neutrophil % : 79.5 %  Auto Lymphocyte % : 10.8 %  Auto Monocyte % : 6.3 %  Auto Eosinophil % : 1.7 %  Auto Basophil % : 0.7 %    12-02    135  |  99  |  13.8  ----------------------------<  119<H>  3.6   |  25.0  |  1.07    Ca    9.2      02 Dec 2022 10:04    TPro  7.0  /  Alb  4.4  /  TBili  0.5  /  DBili  x   /  AST  27  /  ALT  38  /  AlkPhos  61  12-01    CARDIAC MARKERS ( 02 Dec 2022 10:04 )  x     / <0.01 ng/mL / 101 U/L / x     / x      CARDIAC MARKERS ( 02 Dec 2022 03:16 )  x     / <0.01 ng/mL / 102 U/L / x     / x      CARDIAC MARKERS ( 01 Dec 2022 18:10 )  x     / <0.01 ng/mL / x     / x     / x              MEDICATIONS  (STANDING):  amLODIPine   Tablet 5 milliGRAM(s) Oral daily  aspirin enteric coated 81 milliGRAM(s) Oral daily  atorvastatin 80 milliGRAM(s) Oral at bedtime  fenofibrate Tablet 145 milliGRAM(s) Oral daily  hydrochlorothiazide 12.5 milliGRAM(s) Oral daily  hydroxychloroquine 200 milliGRAM(s) Oral two times a day  influenza   Vaccine 0.5 milliLiter(s) IntraMuscular once  losartan 100 milliGRAM(s) Oral daily  metoprolol succinate ER 25 milliGRAM(s) Oral daily  mycophenolate mofetil 500 milliGRAM(s) Oral daily  pantoprazole    Tablet 40 milliGRAM(s) Oral before breakfast    MEDICATIONS  (PRN):  acetaminophen     Tablet .. 650 milliGRAM(s) Oral every 6 hours PRN Temp greater or equal to 38C (100.4F), Mild Pain (1 - 3), Moderate Pain (4 - 6)  ondansetron Injectable 4 milliGRAM(s) IV Push every 6 hours PRN Nausea and/or Vomiting        HPI:  57yoM hx non-obstructive CAD, SLE, HTN, HLD presenting with 2 weeks of intermittent chest pain. Pt reports burning like sensation in epigastric and lower chest area that often radiates to his left shoulder and is associated with palpitations and dyspnea.  The chest pain episodes can for 15-20 seconds at a time and occur with exertion and rest.  Pt reports cardiac cath about 1 decade ago that showed a 50% occlusion in LAD without any PCI done. He also reports having abnormal stress testing (both exercise and nuclear) within the past few years.   He states that he underwent a cardiac MRI after the abnormal stress testing and was advised to have no further testing at the time.  (01 Dec 2022 21:39)    now s/p [facility] with successful PCI and MICHAEL to the 90% stenosis of the diag and 2 MICHAEL to the pLAD 80% stenosis    ASSESSMENT/PLAN:    Coronary artery disease  -Return to telemetry bed  -VS, labs, diet, activity as per PCI orders  -IV hydration  -Encourage PO fluids  -ASA 81 mg PO daily  -Effient 10 mg PO daily  -Toprol XL 25 mg PO daily  -Losartan 100 mg PO daily  -atorvastatin 80 mg PO QHS   -Plan of care discussed with patient, family and MD  -likely d/c in AM if patient remains stable overnight  -NP to see in AM to evaluate labs, EKG and procedure site check  -Cardiac rehab info provided/referral and communication to cardiac rehab completed  -Follow-up with attending Dr Mike within 1 week  -Discussed therapeutic lifestyle changes to reduce risk factors such as following a cardiac diet, weight loss, maintaining a healthy weight, exercise, smoking cessation, medication compliance, and regular follow-up  with MD to know your numbers (BP, cholesterol, weight, and glucose) Nurse Practitioner Progress note:   s/p [facility] with successful PCI and MICHAEL to the 90% stenosis of the diag and 2 MICHAEL to the pLAD 80% stenosis with Dr Calvin Mike  Patient loaded with Effient 60 mg po in the lab      Patient feels well.  Denies chest pain, shortness of breath, dizziness or palpitations at this time    Pt A&O x 3  Lungs CTA   S1S2 no MRG  Telemetry Sinus Rhythm  Right radial hemoband in place.  Procedure site CDI, no bleeding, no hematoma, able to move all digits with capillary refill < 3 seconds, fingers warm      T(C): 36.7 (12-02-22 @ 16:50), Max: 37.4 (12-02-22 @ 00:10)  HR: 73 (12-02-22 @ 16:50) (65 - 94)  BP: 162/91 (12-02-22 @ 16:50) (116/71 - 162/91)  RR: 18 (12-02-22 @ 16:50) (15 - 20)  SpO2: 95% (12-02-22 @ 16:50) (95% - 97%)    CBC Full  -  ( 01 Dec 2022 18:10 )  WBC Count : 5.85 K/uL  RBC Count : 4.69 M/uL  Hemoglobin : 14.8 g/dL  Hematocrit : 40.7 %  Platelet Count - Automated : 207 K/uL  Mean Cell Volume : 86.8 fl  Mean Cell Hemoglobin : 31.6 pg  Mean Cell Hemoglobin Concentration : 36.4 gm/dL  Auto Neutrophil # : 4.65 K/uL  Auto Lymphocyte # : 0.63 K/uL  Auto Monocyte # : 0.37 K/uL  Auto Eosinophil # : 0.10 K/uL  Auto Basophil # : 0.04 K/uL  Auto Neutrophil % : 79.5 %  Auto Lymphocyte % : 10.8 %  Auto Monocyte % : 6.3 %  Auto Eosinophil % : 1.7 %  Auto Basophil % : 0.7 %    12-02    135  |  99  |  13.8  ----------------------------<  119<H>  3.6   |  25.0  |  1.07    Ca    9.2      02 Dec 2022 10:04    TPro  7.0  /  Alb  4.4  /  TBili  0.5  /  DBili  x   /  AST  27  /  ALT  38  /  AlkPhos  61  12-01    CARDIAC MARKERS ( 02 Dec 2022 10:04 )  x     / <0.01 ng/mL / 101 U/L / x     / x      CARDIAC MARKERS ( 02 Dec 2022 03:16 )  x     / <0.01 ng/mL / 102 U/L / x     / x      CARDIAC MARKERS ( 01 Dec 2022 18:10 )  x     / <0.01 ng/mL / x     / x     / x              MEDICATIONS  (STANDING):  amLODIPine   Tablet 5 milliGRAM(s) Oral daily  aspirin enteric coated 81 milliGRAM(s) Oral daily  atorvastatin 80 milliGRAM(s) Oral at bedtime  fenofibrate Tablet 145 milliGRAM(s) Oral daily  hydrochlorothiazide 12.5 milliGRAM(s) Oral daily  hydroxychloroquine 200 milliGRAM(s) Oral two times a day  influenza   Vaccine 0.5 milliLiter(s) IntraMuscular once  losartan 100 milliGRAM(s) Oral daily  metoprolol succinate ER 25 milliGRAM(s) Oral daily  mycophenolate mofetil 500 milliGRAM(s) Oral daily  pantoprazole    Tablet 40 milliGRAM(s) Oral before breakfast    MEDICATIONS  (PRN):  acetaminophen     Tablet .. 650 milliGRAM(s) Oral every 6 hours PRN Temp greater or equal to 38C (100.4F), Mild Pain (1 - 3), Moderate Pain (4 - 6)  ondansetron Injectable 4 milliGRAM(s) IV Push every 6 hours PRN Nausea and/or Vomiting        HPI:  57yoM hx non-obstructive CAD, SLE, HTN, HLD presenting with 2 weeks of intermittent chest pain. Pt reports burning like sensation in epigastric and lower chest area that often radiates to his left shoulder and is associated with palpitations and dyspnea.  The chest pain episodes can for 15-20 seconds at a time and occur with exertion and rest.  Pt reports cardiac cath about 1 decade ago that showed a 50% occlusion in LAD without any PCI done. He also reports having abnormal stress testing (both exercise and nuclear) within the past few years.   He states that he underwent a cardiac MRI after the abnormal stress testing and was advised to have no further testing at the time.  (01 Dec 2022 21:39)    now s/p [facility] with successful PCI and MICHAEL to the 90% stenosis of the diag and 2 MICHAEL to the pLAD 80% stenosis    ASSESSMENT/PLAN:    Coronary artery disease  -Return to telemetry bed  -VS, labs, diet, activity as per PCI orders  -IV hydration  -Encourage PO fluids  -ASA 81 mg PO daily  -Effient 10 mg PO daily  -Toprol XL 25 mg PO daily  -Losartan 100 mg PO daily  -atorvastatin 80 mg PO QHS   -Plan of care discussed with patient, family and MD  -likely d/c in AM if patient remains stable overnight  -NP to see in AM to evaluate labs, EKG and procedure site check  -Cardiac rehab info provided/referral and communication to cardiac rehab completed  - Restricted use with no heavy lifting of affected arm for 48 hours.  No submerging the arm in water for 48 hours.  You may start showering today.  Call your doctor for any bleeding, swelling, loss of sensation in the hand or fingers, or fingers turning blue.  If heavy bleeding or large lumps form, hold pressure at the spot and come to the Emergency Room.      -Follow-up with attending Dr Mike within 1 week  -Discussed therapeutic lifestyle changes to reduce risk factors such as following a cardiac diet, weight loss, maintaining a healthy weight, exercise, smoking cessation, medication compliance, and regular follow-up  with MD to know your numbers (BP, cholesterol, weight, and glucose)

## 2022-12-02 NOTE — CHART NOTE - NSCHARTNOTEFT_GEN_A_CORE
Patient received for a LH secondary to c/o intermittent CP radiating to the L Shoulder    History of Present Illness:   57yoM hx non-obstructive CAD, SLE, HTN, HLD presenting with 2 weeks of intermittent chest pain. Pt reports burning like sensation in epigastric and lower chest area that often radiates to his left shoulder and is associated with palpitations and dyspnea.  The chest pain episodes can for 15-20 seconds at a time and occur with exertion and rest.  Pt reports cardiac cath about 1 decade ago that showed a 50% occlusion in LAD without any PCI done. He also reports having abnormal stress testing (both exercise and nuclear) within the past few years.   He states that he underwent a cardiac MRI after the abnormal stress testing and was advised to have no further testing at the time.         Patient A&O x 3  Lungs CTA  S1S2 no MRG  Telemetry - Sinus Rhythm    ASA 2  Mallampati 2  GFR 67  Creat 1.24  Bleeding Risk 0.9%    Pt NPO  Troponin levels negative x 3    Plan LHC

## 2022-12-02 NOTE — PROGRESS NOTE ADULT - SUBJECTIVE AND OBJECTIVE BOX
seen for chest pain    intermittent chest pain  no sob   ros negative     MEDICATIONS  (STANDING):  amLODIPine   Tablet 5 milliGRAM(s) Oral daily  aspirin enteric coated 81 milliGRAM(s) Oral daily  atorvastatin 80 milliGRAM(s) Oral at bedtime  fenofibrate Tablet 145 milliGRAM(s) Oral daily  hydrochlorothiazide 12.5 milliGRAM(s) Oral daily  hydroxychloroquine 200 milliGRAM(s) Oral two times a day  influenza   Vaccine 0.5 milliLiter(s) IntraMuscular once  losartan 100 milliGRAM(s) Oral daily  mycophenolate mofetil 500 milliGRAM(s) Oral daily  pantoprazole    Tablet 40 milliGRAM(s) Oral before breakfast    MEDICATIONS  (PRN):  acetaminophen     Tablet .. 650 milliGRAM(s) Oral every 6 hours PRN Temp greater or equal to 38C (100.4F), Mild Pain (1 - 3), Moderate Pain (4 - 6)  ondansetron Injectable 4 milliGRAM(s) IV Push every 6 hours PRN Nausea and/or Vomiting      Allergies    No Known Allergies        Vital Signs Last 24 Hrs  T(C): 36.4 (02 Dec 2022 07:17), Max: 37.4 (02 Dec 2022 00:10)  T(F): 97.6 (02 Dec 2022 07:17), Max: 99.3 (02 Dec 2022 00:10)  HR: 70 (02 Dec 2022 07:17) (65 - 111)  BP: 132/85 (02 Dec 2022 07:17) (129/85 - 171/91)  BP(mean): --  RR: 18 (02 Dec 2022 07:17) (15 - 20)  SpO2: 97% (02 Dec 2022 07:17) (94% - 97%)    Parameters below as of 02 Dec 2022 07:17  Patient On (Oxygen Delivery Method): room air        PHYSICAL EXAM:    GENERAL: NAD  CHEST/LUNG: Clear to ausculation bilaterallys  HEART: Regular rate and rhythm; S1 S2;  ABDOMEN: Soft, Nontender,  Bowel sounds present  EXTREMITIES:  no edema   NERVOUS SYSTEM:  Alert & Oriented X3,  Motor Strength 5/5 B/L upper and lower extremities  PSYCH: normal mood, appropriate response.    LABS:                        14.8   5.85  )-----------( 207      ( 01 Dec 2022 18:10 )             40.7     12-02    135  |  99  |  13.8  ----------------------------<  119<H>  3.6   |  25.0  |  1.07    Ca    9.2      02 Dec 2022 10:04    TPro  7.0  /  Alb  4.4  /  TBili  0.5  /  DBili  x   /  AST  27  /  ALT  38  /  AlkPhos  61  12-01          CAPILLARY BLOOD GLUCOSE            RADIOLOGY & ADDITIONAL TESTS:

## 2022-12-03 ENCOUNTER — TRANSCRIPTION ENCOUNTER (OUTPATIENT)
Age: 57
End: 2022-12-03

## 2022-12-03 VITALS — DIASTOLIC BLOOD PRESSURE: 82 MMHG | RESPIRATION RATE: 17 BRPM | HEART RATE: 72 BPM | SYSTOLIC BLOOD PRESSURE: 135 MMHG

## 2022-12-03 LAB
ANION GAP SERPL CALC-SCNC: 12 MMOL/L — SIGNIFICANT CHANGE UP (ref 5–17)
BASOPHILS # BLD AUTO: 0.05 K/UL — SIGNIFICANT CHANGE UP (ref 0–0.2)
BASOPHILS NFR BLD AUTO: 1 % — SIGNIFICANT CHANGE UP (ref 0–2)
BUN SERPL-MCNC: 14 MG/DL — SIGNIFICANT CHANGE UP (ref 8–20)
CALCIUM SERPL-MCNC: 9.1 MG/DL — SIGNIFICANT CHANGE UP (ref 8.4–10.5)
CHLORIDE SERPL-SCNC: 99 MMOL/L — SIGNIFICANT CHANGE UP (ref 96–108)
CO2 SERPL-SCNC: 24 MMOL/L — SIGNIFICANT CHANGE UP (ref 22–29)
CREAT SERPL-MCNC: 1.02 MG/DL — SIGNIFICANT CHANGE UP (ref 0.5–1.3)
EGFR: 85 ML/MIN/1.73M2 — SIGNIFICANT CHANGE UP
EOSINOPHIL # BLD AUTO: 0.18 K/UL — SIGNIFICANT CHANGE UP (ref 0–0.5)
EOSINOPHIL NFR BLD AUTO: 3.5 % — SIGNIFICANT CHANGE UP (ref 0–6)
GLUCOSE SERPL-MCNC: 144 MG/DL — HIGH (ref 70–99)
HCT VFR BLD CALC: 38.4 % — LOW (ref 39–50)
HGB BLD-MCNC: 13.7 G/DL — SIGNIFICANT CHANGE UP (ref 13–17)
IMM GRANULOCYTES NFR BLD AUTO: 0.8 % — SIGNIFICANT CHANGE UP (ref 0–0.9)
LYMPHOCYTES # BLD AUTO: 0.91 K/UL — LOW (ref 1–3.3)
LYMPHOCYTES # BLD AUTO: 17.7 % — SIGNIFICANT CHANGE UP (ref 13–44)
MAGNESIUM SERPL-MCNC: 1.9 MG/DL — SIGNIFICANT CHANGE UP (ref 1.6–2.6)
MCHC RBC-ENTMCNC: 30.9 PG — SIGNIFICANT CHANGE UP (ref 27–34)
MCHC RBC-ENTMCNC: 35.7 GM/DL — SIGNIFICANT CHANGE UP (ref 32–36)
MCV RBC AUTO: 86.7 FL — SIGNIFICANT CHANGE UP (ref 80–100)
MONOCYTES # BLD AUTO: 0.42 K/UL — SIGNIFICANT CHANGE UP (ref 0–0.9)
MONOCYTES NFR BLD AUTO: 8.2 % — SIGNIFICANT CHANGE UP (ref 2–14)
NEUTROPHILS # BLD AUTO: 3.53 K/UL — SIGNIFICANT CHANGE UP (ref 1.8–7.4)
NEUTROPHILS NFR BLD AUTO: 68.8 % — SIGNIFICANT CHANGE UP (ref 43–77)
PLATELET # BLD AUTO: 188 K/UL — SIGNIFICANT CHANGE UP (ref 150–400)
POTASSIUM SERPL-MCNC: 3.8 MMOL/L — SIGNIFICANT CHANGE UP (ref 3.5–5.3)
POTASSIUM SERPL-SCNC: 3.8 MMOL/L — SIGNIFICANT CHANGE UP (ref 3.5–5.3)
RBC # BLD: 4.43 M/UL — SIGNIFICANT CHANGE UP (ref 4.2–5.8)
RBC # FLD: 12.9 % — SIGNIFICANT CHANGE UP (ref 10.3–14.5)
SODIUM SERPL-SCNC: 135 MMOL/L — SIGNIFICANT CHANGE UP (ref 135–145)
WBC # BLD: 5.13 K/UL — SIGNIFICANT CHANGE UP (ref 3.8–10.5)
WBC # FLD AUTO: 5.13 K/UL — SIGNIFICANT CHANGE UP (ref 3.8–10.5)

## 2022-12-03 PROCEDURE — 85025 COMPLETE CBC W/AUTO DIFF WBC: CPT

## 2022-12-03 PROCEDURE — 93458 L HRT ARTERY/VENTRICLE ANGIO: CPT | Mod: 59

## 2022-12-03 PROCEDURE — C9600: CPT | Mod: LD

## 2022-12-03 PROCEDURE — C1725: CPT

## 2022-12-03 PROCEDURE — 82550 ASSAY OF CK (CPK): CPT

## 2022-12-03 PROCEDURE — 93005 ELECTROCARDIOGRAM TRACING: CPT

## 2022-12-03 PROCEDURE — C1887: CPT

## 2022-12-03 PROCEDURE — C1894: CPT

## 2022-12-03 PROCEDURE — C1769: CPT

## 2022-12-03 PROCEDURE — 36415 COLL VENOUS BLD VENIPUNCTURE: CPT

## 2022-12-03 PROCEDURE — 92978 ENDOLUMINL IVUS OCT C 1ST: CPT | Mod: LD

## 2022-12-03 PROCEDURE — 83735 ASSAY OF MAGNESIUM: CPT

## 2022-12-03 PROCEDURE — 80048 BASIC METABOLIC PNL TOTAL CA: CPT

## 2022-12-03 PROCEDURE — 71045 X-RAY EXAM CHEST 1 VIEW: CPT

## 2022-12-03 PROCEDURE — 93571 IV DOP VEL&/PRESS C FLO 1ST: CPT | Mod: 52,LD

## 2022-12-03 PROCEDURE — 86803 HEPATITIS C AB TEST: CPT

## 2022-12-03 PROCEDURE — 83690 ASSAY OF LIPASE: CPT

## 2022-12-03 PROCEDURE — C8929: CPT

## 2022-12-03 PROCEDURE — 83880 ASSAY OF NATRIURETIC PEPTIDE: CPT

## 2022-12-03 PROCEDURE — C1874: CPT

## 2022-12-03 PROCEDURE — 85379 FIBRIN DEGRADATION QUANT: CPT

## 2022-12-03 PROCEDURE — 99239 HOSP IP/OBS DSCHRG MGMT >30: CPT

## 2022-12-03 PROCEDURE — 87637 SARSCOV2&INF A&B&RSV AMP PRB: CPT

## 2022-12-03 PROCEDURE — C1753: CPT

## 2022-12-03 PROCEDURE — 80053 COMPREHEN METABOLIC PANEL: CPT

## 2022-12-03 PROCEDURE — 93010 ELECTROCARDIOGRAM REPORT: CPT

## 2022-12-03 PROCEDURE — 99285 EMERGENCY DEPT VISIT HI MDM: CPT

## 2022-12-03 PROCEDURE — C9601: CPT | Mod: LD

## 2022-12-03 PROCEDURE — 84484 ASSAY OF TROPONIN QUANT: CPT

## 2022-12-03 RX ORDER — PRASUGREL 5 MG/1
30 TABLET, FILM COATED ORAL
Qty: 30 | Refills: 1
Start: 2022-12-03 | End: 2023-01-31

## 2022-12-03 RX ORDER — MAGNESIUM SULFATE 500 MG/ML
1 VIAL (ML) INJECTION ONCE
Refills: 0 | Status: COMPLETED | OUTPATIENT
Start: 2022-12-03 | End: 2022-12-03

## 2022-12-03 RX ORDER — METOPROLOL TARTRATE 50 MG
1 TABLET ORAL
Qty: 90 | Refills: 0
Start: 2022-12-03 | End: 2023-03-02

## 2022-12-03 RX ADMIN — Medication 100 GRAM(S): at 07:02

## 2022-12-03 RX ADMIN — Medication 81 MILLIGRAM(S): at 10:16

## 2022-12-03 RX ADMIN — Medication 200 MILLIGRAM(S): at 08:30

## 2022-12-03 RX ADMIN — LOSARTAN POTASSIUM 100 MILLIGRAM(S): 100 TABLET, FILM COATED ORAL at 07:02

## 2022-12-03 RX ADMIN — Medication 25 MILLIGRAM(S): at 08:27

## 2022-12-03 RX ADMIN — AMLODIPINE BESYLATE 5 MILLIGRAM(S): 2.5 TABLET ORAL at 07:02

## 2022-12-03 RX ADMIN — PRASUGREL 10 MILLIGRAM(S): 5 TABLET, FILM COATED ORAL at 10:16

## 2022-12-03 RX ADMIN — PANTOPRAZOLE SODIUM 40 MILLIGRAM(S): 20 TABLET, DELAYED RELEASE ORAL at 07:02

## 2022-12-03 NOTE — DISCHARGE NOTE PROVIDER - NSDCCPCAREPLAN_GEN_ALL_CORE_FT
PRINCIPAL DISCHARGE DIAGNOSIS  Diagnosis: Angina pectoris  Assessment and Plan of Treatment: s/p pci

## 2022-12-03 NOTE — DISCHARGE NOTE NURSING/CASE MANAGEMENT/SOCIAL WORK - PATIENT PORTAL LINK FT
You can access the FollowMyHealth Patient Portal offered by [facility] by registering at the following website: [email or web address]. By joining [facility]’s FollowMyHealth portal, you will also be able to view your health information using other applications (apps) compatible with our system.

## 2022-12-03 NOTE — DISCHARGE NOTE PROVIDER - CARE PROVIDER_API CALL
Macrina Hu)  Internal Medicine  [address] [place] [id number]  Phone: [phone]  Fax: [phone]  Follow Up Time:     [place] [address]  Phone: [phone]  Fax: [phone]  Follow Up Time:

## 2022-12-03 NOTE — DISCHARGE NOTE PROVIDER - NSDCMRMEDTOKEN_GEN_ALL_CORE_FT
amLODIPine 5 mg oral tablet: 1 tab(s) orally once a day  aspirin 81 mg oral tablet: 1 tab(s) orally once a day  azelastine 137 mcg/inh (0.1%) nasal spray: 2 spray(s) nasal 2 times a day, As Needed  fenofibrate 160 mg oral tablet: 1 tab(s) orally once a day  hydroxychloroquine 200 mg oral tablet: 1 tab(s) orally 2 times a day  losartan-hydrochlorothiazide 100mg-12.5mg oral tablet: 1 tab(s) orally once a day  metoprolol succinate 25 mg oral tablet, extended release: 1 tab(s) orally once a day  mycophenolate mofetil 500 mg oral tablet: 1 tab(s) orally once a day  omeprazole 20 mg oral delayed release capsule: 1 cap(s) orally once a day  prasugrel 10 mg oral tablet: 30 tab(s) orally once a day   rosuvastatin 20 mg oral tablet: 1 tab(s) orally once a day

## 2022-12-03 NOTE — DISCHARGE NOTE PROVIDER - HOSPITAL COURSE
57yoM hx non-obstructive CAD, SLE, HTN, HLD presenting with 2 weeks of intermittent chest pain. Pt reports burning like sensation in epigastric and lower chest area that often radiates to his left shoulder and is associated with palpitations and dyspnea.  The chest pain episodes can for 15-20 seconds at a time and occur with exertion and rest.  Pt reports cardiac cath about 1 decade ago that showed a 50% occlusion in LAD without any PCI done. He also reports having abnormal stress testing (both exercise and nuclear) within the past few years.   He states that he underwent a cardiac MRI after the abnormal stress testing and was advised to have no further testing at the time.    Now POD #1 s/p [facility] with successful PCI and MICHAEL to the 90% stenosis of the diag and 2 MICHAEL to the pLAD 80% stenosis with Dr Calvin Mike    -add effient and toprol  -all other medications stay the same  -site precautions reviewed  -spoke to Dr. Ho, pt cleared for discharge

## 2022-12-03 NOTE — PROGRESS NOTE ADULT - ASSESSMENT
Now POD #1 s/p LHC with successful PCI and MICHAEL to the 90% stenosis of the diag and 2 MICHAEL to the pLAD 80% stenosis with Dr Calvin Mike    -add effient and toprol  -all other medications stay the same  -site precautions reviewed  -spoke to Dr. Ho, pt cleared for discharge
57yoM hx non-obstructive CAD (50% occlusion in LAD), SLE, HTN, HLD, recent abnormal stress testing, presenting with 2 weeks of intermittent chest pain associated with palpitations and dyspnea     unstable angina:    cath today    trops negative x3    echo    cardiology following     aspirin/statin    Hx SLE  -Hydroxychloroquine 200mg BID  -Mycophenolate 500mg daily     Hx HTN  -Losartan-HCTZ 100mg-12.5mg daily  -Amlodipine 5mg daily    Hx HLD  -Interchange rosuvastatin 20mg to atorvastatin 80mg daily  -Fenofibrate 145mg daily

## 2022-12-03 NOTE — DISCHARGE NOTE NURSING/CASE MANAGEMENT/SOCIAL WORK - NSDCPEFALRISK_GEN_ALL_CORE
For information on Fall & Injury Prevention, visit: [email or web address] OR  [email or web address] OR  https://www.cdc.gov/steadi/patient.html

## 2022-12-03 NOTE — PROGRESS NOTE ADULT - SUBJECTIVE AND OBJECTIVE BOX
Department of Cardiology                                                                  [facility]/[place] [address] [place] [address]                                                            Telephone: [phone]. Fax:[phone]                                                                             Progress Note      HPI:  57yoM hx non-obstructive CAD, SLE, HTN, HLD presenting with 2 weeks of intermittent chest pain. Pt reports burning like sensation in epigastric and lower chest area that often radiates to his left shoulder and is associated with palpitations and dyspnea.  The chest pain episodes can for 15-20 seconds at a time and occur with exertion and rest.  Pt reports cardiac cath about 1 decade ago that showed a 50% occlusion in LAD without any PCI done. He also reports having abnormal stress testing (both exercise and nuclear) within the past few years.   He states that he underwent a cardiac MRI after the abnormal stress testing and was advised to have no further testing at the time.         	  MEDICATIONS:  amLODIPine   Tablet 5 milliGRAM(s) Oral daily  hydrochlorothiazide 12.5 milliGRAM(s) Oral daily  losartan 100 milliGRAM(s) Oral daily  metoprolol succinate ER 25 milliGRAM(s) Oral daily  hydroxychloroquine 200 milliGRAM(s) Oral two times a day  acetaminophen     Tablet .. 650 milliGRAM(s) Oral every 6 hours PRN  acetaminophen   IVPB .. 1000 milliGRAM(s) IV Intermittent once  ondansetron Injectable 4 milliGRAM(s) IV Push every 6 hours PRN  zolpidem 5 milliGRAM(s) Oral at bedtime PRN  pantoprazole    Tablet 40 milliGRAM(s) Oral before breakfast  atorvastatin 80 milliGRAM(s) Oral at bedtime  fenofibrate Tablet 145 milliGRAM(s) Oral daily  aspirin enteric coated 81 milliGRAM(s) Oral daily  influenza   Vaccine 0.5 milliLiter(s) IntraMuscular once  mycophenolate mofetil 500 milliGRAM(s) Oral daily  prasugrel 10 milliGRAM(s) Oral daily        ROS: Denies chest pain/SOB/arm pain    PHYSICAL EXAM:  Neuro: A&O X3  Lungs: CTA  CV: RRR  Ext: Right radial dressing removed.  Site benign.  No bleeding/hematoma/ecchymosis.  + palp pulse    T(C): 36.6 (12-03-22 @ 07:10), Max: 36.7 (12-02-22 @ 16:10)  HR: 72 (12-03-22 @ 08:41) (60 - 83)  BP: 135/82 (12-03-22 @ 08:41) (113/66 - 167/85)  RR: 17 (12-03-22 @ 08:41) (16 - 18)  SpO2: 97% (12-03-22 @ 01:30) (95% - 98%)  Wt(kg): --      TELEMETRY: 	  NSR    ECG:  	NSR no acute changes    LABS:	 	                          13.7   5.13  )-----------( 188      ( 03 Dec 2022 03:38 )             38.4     12-03    135  |  99  |  14.0  ----------------------------<  144<H>  3.8   |  24.0  |  1.02    Ca    9.1      03 Dec 2022 03:38  Mg     1.9     12-03    TPro  7.0  /  Alb  4.4  /  TBili  0.5  /  DBili  x   /  AST  27  /  ALT  38  /  AlkPhos  61  12-01

## 2022-12-03 NOTE — DISCHARGE NOTE PROVIDER - ATTENDING DISCHARGE PHYSICAL EXAMINATION:
Vital Signs Last 24 Hrs  T(F): 97.9 (03 Dec 2022 07:10), Max: 98.1 (02 Dec 2022 16:50)  HR: 72 (03 Dec 2022 08:41) (60 - 83)  BP: 135/82 (03 Dec 2022 08:41) (113/66 - 167/85)  RR: 17 (03 Dec 2022 08:41) (16 - 18)  SpO2: 97% (03 Dec 2022 01:30) (95% - 98%)    Physical Exam:  Constitutional: NAD  HEENT: NC/AT, PERRL, EOMI, trachea midline, no JVD  Respiratory: CTA bilaterally, symmetrical chest rise  Cardiovascular: RRR, no m/g/r  Gastrointestinal: Soft, NT/ND, BS+  Vascular: 2+ peripheral pulses  Neurological: A/O x 3, no focal neurological deficits  Psych: Fair mood/affect  Musculoskeletal: No edema, cyanosis, deformities. ROM normal  Skin: No obvious rash, lesions. No jaundice.

## 2022-12-03 NOTE — DISCHARGE NOTE PROVIDER - NSDCCPTREATMENT_GEN_ALL_CORE_FT
PRINCIPAL PROCEDURE  Procedure: Left heart cardiac cath  Findings and Treatment: s/p PCI  asa, effient

## 2022-12-05 ENCOUNTER — TRANSCRIPTION ENCOUNTER (OUTPATIENT)
Age: 57
End: 2022-12-05

## 2023-02-19 ENCOUNTER — NON-APPOINTMENT (OUTPATIENT)
Age: 58
End: 2023-02-19

## 2023-04-03 NOTE — PATIENT PROFILE ADULT - FUNCTIONAL ASSESSMENT - BASIC MOBILITY 1.
[de-identified] : 2/6/2023: Sinus  Rhythm at 60 beats per minute with poor R-wave progression, low voltage QRS, and nonspecific ST abnormalities [de-identified] : Exercise Stress Test performed on 3/29/2023: Good exercise capacity (10 METS). \par Stress EC.0 mm upsloping ST segment depression in leads II, III and aVF starting at 8:50 minutes during stress at 136 bpm and persisting 1:56 minutes into recovery. 1.0 mm upsloping ST segment depression in leads V4, V5 and V6 starting at 7:13 minutes during stress at 127 bpm and persisting 3:50 minutes into recovery. [de-identified] : 3/28/2023: Normal left ventricular systolic function. EF is approximately 60%. Mild concentric left ventricular hypertrophy. The left atrium is mildly dilated. The right atrium is mildly dilated. Mild to moderate aortic regurgitation. Mild to moderate mitral regurgitation. Mild to moderate tricuspid regurgitation. No echocardiographic evidence of pulmonary hypertension. PASP= 26 mmHg.\par 7. Calcified trileaflet aortic valve with normal opening. Mild-to-moderate aortic regurgitation.\par 8. The proximal ascending aorta is mildly dilated, measuring approximately 3.9 cm. 4 = No assist / stand by assistance

## 2023-06-29 ENCOUNTER — APPOINTMENT (OUTPATIENT)
Dept: DERMATOLOGY | Facility: CLINIC | Age: 58
End: 2023-06-29
Payer: COMMERCIAL

## 2023-06-29 PROCEDURE — 17110 DESTRUCTION B9 LES UP TO 14: CPT

## 2023-06-29 PROCEDURE — 17000 DESTRUCT PREMALG LESION: CPT | Mod: 59

## 2023-06-29 PROCEDURE — 17003 DESTRUCT PREMALG LES 2-14: CPT | Mod: 59

## 2023-06-29 PROCEDURE — 99203 OFFICE O/P NEW LOW 30 MIN: CPT | Mod: 25

## 2023-08-14 NOTE — ED ADULT TRIAGE NOTE - BMI (KG/M2)
24 Mirvaso Counseling: Mirvaso is a topical medication which can decrease superficial blood flow where applied. Side effects are uncommon and include stinging, redness and allergic reactions.

## 2023-10-02 NOTE — PATIENT PROFILE ADULT - FUNCTIONAL ASSESSMENT - DAILY ACTIVITY 5.
Pt was dc from ed earlier today, was given Robaxin and Lidocaine patch, c/o R eye swelling/itching 4 = No assist / stand by assistance

## 2023-12-10 NOTE — ED ADULT TRIAGE NOTE - HEIGHT IN FEET
BMI down from 54->47.  On Mounjaro 10 mg weekly mainly for obesity..  Stay on GLP-1 (Mounjaro) for cardiac issues and obesity.     Metformin stopped since A1c now normal.   Ophthalmology note faxed from visit on 7/20/2023, no DR. Sees Dr. Solis.  Microalbumin-ordered.  F/u 3 months.   A1c is 5.1, can probably recheck in 6 months.  Follow-up in 6 months.  Microalbumin normal.       6

## 2024-01-01 NOTE — ED ADULT TRIAGE NOTE - MEANS OF ARRIVAL
ambulatory [Verbal patient instructions provided] : Verbal patient instructions provided. [FreeTextEntry1] : Follow-up with NICU Grad clinic appt on 2024 at 9:45 AM. Follow-up with Pediatric Cardiology appt on 2024. Follow-up with Pediatric GI appt on 2024. Needs Developmental Pediatrics appt in 4 months, please call to schedule appointment; phone [phone]. Please call to schedule appointment with Speech therapist. Referral to Speech therapy provided today, phone [phone]. Mom to consider G-tube surgery. [FreeTextEntry2] : Continue exercises and start tummy time. [FreeTextEntry3] : Recommended, please call to schedule. [FreeTextEntry4] : Continue Enfamil Gentlease 27 kcal 80mL every 3 hours. [FreeTextEntry6] : n/a [FreeTextEntry5] : Continue Digoxin, Enalapril, Lasix, Aspirin and Vitamins. [FreeTextEntry7] : n/a [FreeTextEntry8] : PMD to do [de-identified] : Needed, PMD to do     Eligible for Beyfortus( Nirsevimab) when in season &. Parents to discuss with PMD           [FreeTextEntry9] : n/a [de-identified] : Aquaphor for skin during winter months  / Aquaphor for skin , avoid  direct sun exposure during summer months [de-identified] : None [de-identified] : None

## 2024-06-22 NOTE — ED STATDOCS - TEMPLATE, MLM
Physician Discharge Summary     Patient ID:    Alina Sánchez  [id number]  64 y.o.  1959    Admit date: 6/19/2024    Discharge date and time: 6/22/2024 9:35 AM    Discharge condition: Stable    Admission HPI:  Alina Sánchez is a 64 y.o. 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 Initially treated with flomax and abx and felt improved but then got worse overnight.  Workup in ED showed wbc 12, Hg 12, plt 210, Na 123, K 5.1, BUN 28, Cr 1.63, glucose 146, lipase 76.  Ct abd/pelvis showed persistent UVJ with increased mild proximal left obstructive uropathy, increased left ureteritis with new evidence of pyelonephritis with reactive left retroperitoneal lymph nodes.  Patient admitted to hospitalist service for iv abx and pain control.        Hospital Diagnoses and Treatment Rendered:   Left flank pain, multiple distal left ureteral stones with mild obstruction, MESFIN  -appreciate urology  -leukocytosis resolved  -pt passed stone  -cont Flomax  -UC no growth  -Urology OP f/up has been arranged     Acute on chronic hyponatremia, resolved  H/o DI per pt  Nephrology followed  Hyponatremia resolved holding DDVAP, now resumed  Pt has f/up with her endocrinologist this week     Panhypopituitarism, surgical  H/o craniopharyngioma, s/p resection  -continue home cortef, levothyroxine, somatropin  TSH below goal, has f/up with endo as above     MESFIN, resolved  Stop home toradol         Pending results: none    Follow up Care:    PCP in 1-2 weeks. Please call to set up an appointment shortly after discharge.    Follow-up Information       Follow up With Specialties Details Why Contact Info    [facility]  Follow up on 7/19/2024 840 am follow up with Dr. Ortiz at [facility] office on this date [place]-  Abdominal pain pelvis. Coronal and sagittal reconstructions were generated. CT dose reduction was achieved through use of a standardized protocol tailored for this examination and automatic exposure control for dose modulation. FINDINGS: LOWER THORAX: Coronary artery calcific atherosclerosis. Small hiatal hernia. LIVER: No mass. BILIARY TREE: Gallbladder is within normal limits. Upper normal CBD, unchanged. SPLEEN: within normal limits. PANCREAS: Mild atrophy. No mass or inflammation. No change. ADRENALS: Unremarkable. KIDNEYS: Unchanged multiple calculi at the left UVJ. Unchanged mild left hydroureter. Increased urothelial enhancement of the ureter. Increased delayed left nephrogram. Increased mild left hydronephrosis. Increased stranding around the left kidney. No abscess. STOMACH: Unremarkable. SMALL BOWEL: No dilatation or wall thickening. COLON: Mild diverticulosis. No inflammation. APPENDIX: Small versus resected. PERITONEUM: No ascites or pneumoperitoneum. RETROPERITONEUM: Reactive lymph nodes are adjacent to the left renal hilum. Aortic atherosclerosis without aneurysm. No mesenteric arterial occlusion. REPRODUCTIVE ORGANS: Within normal limits. URINARY BLADDER: Left UVJ calculi. Nondistended urinary bladder. BONES: Osteopenia. Mild bilateral hip osteoarthritis. ABDOMINAL WALL: No mass or hernia. ADDITIONAL COMMENTS: N/A     1. Persistent left UVJ calculi with increased mild proximal left obstructive uropathy. 2. Increased left ureteritis. New evidence of pyelonephritis favored over inflammation of the left kidney. No abscess. Reactive left retroperitoneal lymph nodes. Electronically signed by Amrit Obrien    CT ABDOMEN PELVIS WO CONTRAST Additional Contrast? None    Result Date: 6/16/2024  EXAM: CT ABDOMEN PELVIS WO CONTRAST INDICATION: recent ureteral stone s/p stent now with return of pain/colic in LLQ COMPARISON: CT abdomen pelvis June 5, 2024 IV CONTRAST: None. ORAL CONTRAST: None TECHNIQUE: Thin axial images were  Abdominal Pain, N/V/D

## 2024-07-02 ENCOUNTER — APPOINTMENT (OUTPATIENT)
Dept: DERMATOLOGY | Facility: CLINIC | Age: 59
End: 2024-07-02

## 2024-09-19 ENCOUNTER — NON-APPOINTMENT (OUTPATIENT)
Age: 59
End: 2024-09-19

## 2024-09-24 ENCOUNTER — NON-APPOINTMENT (OUTPATIENT)
Age: 59
End: 2024-09-24

## 2024-09-25 ENCOUNTER — APPOINTMENT (OUTPATIENT)
Dept: DERMATOLOGY | Facility: CLINIC | Age: 59
End: 2024-09-25

## 2025-02-25 ENCOUNTER — APPOINTMENT (OUTPATIENT)
Dept: DERMATOLOGY | Facility: CLINIC | Age: 60
End: 2025-02-25
Payer: COMMERCIAL

## 2025-02-25 ENCOUNTER — RESULT REVIEW (OUTPATIENT)
Age: 60
End: 2025-02-25

## 2025-02-25 PROCEDURE — 17000 DESTRUCT PREMALG LESION: CPT | Mod: 59

## 2025-02-25 PROCEDURE — 12031 INTMD RPR S/A/T/EXT 2.5 CM/<: CPT | Mod: 59

## 2025-02-25 PROCEDURE — 99213 OFFICE O/P EST LOW 20 MIN: CPT | Mod: 25

## 2025-02-25 PROCEDURE — 11106 INCAL BX SKN SINGLE LES: CPT

## 2025-04-22 ENCOUNTER — EMERGENCY (EMERGENCY)
Facility: HOSPITAL | Age: 60
LOS: 1 days | End: 2025-04-22
Attending: EMERGENCY MEDICINE
Payer: COMMERCIAL

## 2025-04-22 VITALS
OXYGEN SATURATION: 95 % | RESPIRATION RATE: 16 BRPM | HEIGHT: 74 IN | DIASTOLIC BLOOD PRESSURE: 92 MMHG | SYSTOLIC BLOOD PRESSURE: 159 MMHG | TEMPERATURE: 98 F | WEIGHT: 245.15 LBS | HEART RATE: 79 BPM

## 2025-04-22 DIAGNOSIS — Z98.890 OTHER SPECIFIED POSTPROCEDURAL STATES: Chronic | ICD-10-CM

## 2025-04-22 DIAGNOSIS — J33.8 OTHER POLYP OF SINUS: Chronic | ICD-10-CM

## 2025-04-22 PROCEDURE — 99282 EMERGENCY DEPT VISIT SF MDM: CPT

## 2025-04-22 PROCEDURE — 99283 EMERGENCY DEPT VISIT LOW MDM: CPT

## 2025-04-22 NOTE — ED PROVIDER NOTE - PATIENT PORTAL LINK FT
You can access the FollowMyHealth Patient Portal offered by [facility] by registering at the following website: [email or web address]. By joining [employer]’s FollowMyHealth portal, you will also be able to view your health information using other applications (apps) compatible with our system.

## 2025-04-22 NOTE — ED PROVIDER NOTE - CARE PROVIDER_API CALL
Anjali Franco  Orthopaedic Surgery  [address] [place] [address]  Phone: [phone]  Fax: [phone]  Follow Up Time: Routine

## 2025-04-22 NOTE — ED PROVIDER NOTE - PHYSICAL EXAMINATION
VITAL SIGNS: I have reviewed nursing notes and confirm.  CONSTITUTIONAL:  In no acute distress.  SKIN: Skin exam is warm and dry, no acute rash.  HEAD: Normocephalic; atraumatic.  EYES: PERRL, EOM intact; conjunctiva and sclera clear.  ENT: No nasal discharge; airway clear. Throat clear.  NECK: Supple; non tender.    CARD: Regular rate and rhythm.  RESP: No wheezes,  no rales or rhonchi.   ABD:  Soft; non-distended; non-tender;   EXT: +Nonerythematous, nontender mobile, soft bump in R elbow. Normal ROM. No clubbing, cyanosis or edema.  NEURO: Alert, oriented. Grossly unremarkable. No focal deficits.  moves all extremities,  normal gait   PSYCH: Cooperative, appropriate.

## 2025-04-22 NOTE — ED PROVIDER NOTE - ATTENDING CONTRIBUTION TO CARE
Problem: NORMAL   Goal: Experiences normal transition  Description: INTERVENTIONS:  - Assess and monitor vital signs and lab values.  - Encourage skin-to-skin with caregiver for thermoregulation  - Assess signs, symptoms and risk factors for hypoglycemia and follow protocol as needed.  - Assess signs, symptoms and risk factors for jaundice risk and follow protocol as needed.  - Utilize standard precautions and use personal protective equipment as indicated. Wash hands properly before and after each patient care activity.   - Ensure proper skin care and diapering and educate caregiver.  - Follow proper infant identification and infant security measures (secure access to the unit, provider ID, visiting policy, Adcade and Kisses system), and educate caregiver.  - Ensure proper circumcision care and instruct/demonstrate to caregiver.  Outcome: Progressing  Goal: Total weight loss less than 10% of birth weight  Description: INTERVENTIONS:  - Initiate breastfeeding within first hour after birth.   - Encourage rooming-in.  - Assess infant feedings.  - Monitor intake and output and daily weight.  - Encourage maternal fluid intake for breastfeeding mother.  - Encourage feeding on-demand or as ordered per pediatrician.  - Educate caregiver on proper bottle-feeding technique as needed.  - Provide information about early infant feeding cues (e.g., rooting, lip smacking, sucking fingers/hand) versus late cue of crying.  - Review techniques for breastfeeding moms for expression (breast pumping) and storage of breast milk.  Outcome: Progressing     
Saji Young is seen today to receive a WatchPAT home sleep apnea testing (HSAT) device. The WatchPAT HSAT Agreement was reviewed and endorsed by patient. General information regarding operation of the HSAT device was provided. Patient was advised to watch the [facility] instructional video. Patient was advised to contact patient support for any problems using the device. Patient was advised to complete the Morning Questionnaire after awakening/ending the test.    There were no vitals taken for this visit. Patient will return the home sleep testing unit by noon unless otherwise approved. Patient will be contacted once the results have been reviewed by the physician, typically within 10-15 business days.     Hole [employer] Serial Number [id number]
Patient is a 59y man presenting with R elbow swelling 2/2 trauma ~6 weeks ago. . Most likely bursitis with no evidence of septic bursitis. Counseled the patient to take Advil and to use compression wraps for symptom management and to follow-up with orthopedist. return precautions discussed and pt argees with plan     I have personally seen and examined this patient. I have fully participated in the care of this patient. I have made amendments to the documentation where appropriate and otherwise agree with the history, physical exam, and plan as documented by the Resident.

## 2025-04-22 NOTE — ED PROVIDER NOTE - NSFOLLOWUPINSTRUCTIONS_ED_ALL_ED_FT
Please read the information below regarding your diagnosis. Please use the compression wrap and take motrin to help improve the swelling. Please try to reduce physical contact with the right elbow and strenuous activity requiring your right elbow. Please follow-up with the orthopedist for further management. Please come back to the ED if you begin to feel fevers, the swelling become red and painful.    Bursitis  Front view of the knee with a close-up of an inflamed bursa.  Bursitis is when the fluid-filled sac (bursa) that covers and protects a joint is swollen (inflamed). Bursitis is most common near joints such as the knees, elbows, hips, and shoulders. It can cause pain and stiffness.    What are the causes?  An injury to a joint area.  Repeated use of a joint.  Infection.  Certain conditions that cause swelling.  What increases the risk?  Putting stress on a joint over and over again.  Having a condition that weakens your body's defense system (immune system).  Doing any of these often:  Lifting and reaching overhead.  Kneeling or leaning on hard surfaces.  Doing activities that have a motion that you do over and over again. This includes running and walking.  What are the signs or symptoms?  Common symptoms of this condition include:  Pain that gets worse when you move the affected body part or use it to support your body weight.  Irritation and swelling (inflammation).  Stiffness.  Other symptoms include:  Redness.  Swelling.  Tenderness.  Warmth.  Pain that stays after rest.  Fever or chills if there is an infection.  How is this treated?  This condition can often be treated at home with:  Rest.  Ice.  Wrapping the area with an elastic bandage (compression).  Keeping the affected area raised (elevation).  Other treatments may include:  Medicine for pain and swelling.  Shots of medicine to the area to lessen swelling.  Draining fluid out of the bursa.  Antibiotic medicine for infection.  Using a splint, brace, wrap, pads, or walking aid.  Therapy if pain continues or you have limited movement.  Surgery.  Follow these instructions at home:  Medicines    Take over-the-counter and prescription medicines only as told by your doctor.  If you were prescribed an antibiotic medicine, take it as told by your doctor. Do not stop taking it even if you start to feel better.  Managing pain, stiffness, and swelling    Bag of ice on a towel on the skin.  A heating pad being used on the affected joint.  Raise the injured area above the level of your heart while you are sitting or lying down.  If told, put ice on the affected area. To do this:  Put ice in a plastic bag.  Place a towel between your skin and the bag, or between your splint or brace and the bag.  Leave the ice on for 20 minutes, 2–3 times a day.  Take off the ice if your skin turns bright red. This is very important. If you cannot feel pain, heat, or cold, you have a greater risk of damage to the area.  If told, put heat on the affected area. Do this as often as told by your doctor. Use the heat source that your doctor recommends, such as a moist heat pack or a heating pad.  Place a towel between your skin and the heat source.  Leave the heat on for 20–30 minutes.  Take off the heat if your skin turns bright red. This is very important. If you cannot feel pain, heat, or cold, you have a greater risk of getting burned.  General instructions    Rest the affected area as told by your doctor.  Avoid doing things that make the pain worse.  Use a splint, brace, pad, wrap, or walking aid as told by your doctor.  Keep all follow-up visits.  Preventing symptoms    Wear knee pads if you kneel often.  Wear running or walking shoes that fit you well.  Take a lot of breaks during activities that involve doing the same movements again and again.  Before you do any activity that takes a lot of effort, get your body ready by stretching.  Stay at a healthy weight or lose weight if your doctor says you should. If you need help doing this, ask your doctor.  Exercise often. If you start any new physical activity, do it slowly.  Work with your physical or occupational therapist and doctor to find what caused the bursitis.  Contact a doctor if:  You have a fever or chills.  You have symptoms that do not get better with treatment.  You have pain or swelling that:  Gets worse.  Goes away and then comes back.  You have pus coming from the affected area.  You have redness around the affected area.  The affected area is warm to the touch.  Summary  Bursitis is when the fluid-filled sac (bursa) that covers and protects a joint is swollen.  Rest the affected area as told by your doctor.  Avoid doing things that make the pain worse.  Put ice on the affected area as told by your doctor.

## 2025-04-22 NOTE — ED ADULT TRIAGE NOTE - CHIEF COMPLAINT QUOTE
pt walk in c/o right elbow pain after injury last week, large soft raised bump noted to elbow. Pt currently denies pain

## 2025-04-22 NOTE — ED PROVIDER NOTE - OBJECTIVE STATEMENT
Patient is a 59y man w/PMH of CAD, HTN, HLD presenting to the ED with swelling of his R elbow. Patient states that he had trauma to his R elbow around 6 weeks ago during work, which caused bruising and pain down his forearm. After a couple weeks, the pain and bruising resolved, but patient noticed swelling in the R elbow that hasn't been improving for the past couple weeks. Patient states that it's nontender, and just "annoying" every time he rests his elbow down. Patient denies fevers, weakness, loss of sensation, tingling.

## 2025-04-22 NOTE — ED PROVIDER NOTE - CLINICAL SUMMARY MEDICAL DECISION MAKING FREE TEXT BOX
Patient is a 59y man presenting with R elbow swelling 2/2 trauma ~6 weeks ago. VSS and PE revealing nontender, nonerythematous, mobile, soft bump in the R elbow. Most likely bursitis with no evidence of septic bursitis. Counseled the patient to take Advil and to use compression wraps for symptom management and to follow-up with orthopedist. Patient understands and agrees with the plan.

## 2025-05-07 ENCOUNTER — RESULT REVIEW (OUTPATIENT)
Age: 60
End: 2025-05-07

## 2025-05-07 ENCOUNTER — APPOINTMENT (OUTPATIENT)
Dept: DERMATOLOGY | Facility: CLINIC | Age: 60
End: 2025-05-07
Payer: COMMERCIAL

## 2025-05-07 PROCEDURE — 11403 EXC TR-EXT B9+MARG 2.1-3CM: CPT

## 2025-05-07 PROCEDURE — 13121 CMPLX RPR S/A/L 2.6-7.5 CM: CPT

## 2025-05-07 PROCEDURE — 99213 OFFICE O/P EST LOW 20 MIN: CPT | Mod: 25

## 2025-05-09 ENCOUNTER — APPOINTMENT (OUTPATIENT)
Dept: DERMATOLOGY | Facility: CLINIC | Age: 60
End: 2025-05-09
Payer: COMMERCIAL

## 2025-05-09 PROCEDURE — ZZZZZ: CPT

## 2025-05-23 ENCOUNTER — APPOINTMENT (OUTPATIENT)
Dept: DERMATOLOGY | Facility: CLINIC | Age: 60
End: 2025-05-23
Payer: COMMERCIAL

## 2025-05-23 PROCEDURE — ZZZZZ: CPT
